# Patient Record
Sex: MALE | Race: AMERICAN INDIAN OR ALASKA NATIVE | NOT HISPANIC OR LATINO | Employment: OTHER | URBAN - METROPOLITAN AREA
[De-identification: names, ages, dates, MRNs, and addresses within clinical notes are randomized per-mention and may not be internally consistent; named-entity substitution may affect disease eponyms.]

---

## 2017-03-06 PROBLEM — K83.1 OBSTRUCTIVE JAUNDICE: Status: ACTIVE | Noted: 2017-03-06

## 2017-03-06 NOTE — PLAN OF CARE
Outside Transfer Acceptance Note    Transferring Physician or Mid Level Provider/Speciality: Dr. Rivera    Accepting Physician: Noy Sanders     Date of Acceptance: 03/06/2017     Code Status: Full    Transferring Facility/Hospital: [facility]    Reason for Transfer to [facility]: needs AES for ERCP    Report from Transferring Physician or Mid-Level provider/Hospital course: 72 M with a PMH of rectal cancer s/p resection. A few days ago, pt found to be jaundice T bili 9 and elevated ALk phos. CT and ultrasound showed a dilated CBD. ERCP was attempted, but he was not able to get very far to determine the cause of obstruction. T bili now up to 18. Alk phos >1000.      To do list upon patient arrival: Consult AES    Please call extension [phone] upon patient arrival to floor for Hospital Medicine admit team assignment and for additional admit orders. If patient is coming from another [facility] facility please also call [phone] to inform the admit team/office that patient has arrived from the [facility] facility to the floor so patient can be evaluated.

## 2017-03-07 ENCOUNTER — ANESTHESIA EVENT (OUTPATIENT)
Dept: ENDOSCOPY | Facility: HOSPITAL | Age: 72
DRG: 356 | End: 2017-03-07
Payer: MEDICARE

## 2017-03-07 ENCOUNTER — HOSPITAL ENCOUNTER (INPATIENT)
Facility: HOSPITAL | Age: 72
LOS: 2 days | Discharge: HOME | DRG: 356 | End: 2017-03-09
Attending: HOSPITALIST | Admitting: HOSPITALIST
Payer: MEDICARE

## 2017-03-07 DIAGNOSIS — K83.1 OBSTRUCTIVE JAUNDICE: ICD-10-CM

## 2017-03-07 PROBLEM — I10 ESSENTIAL HYPERTENSION: Chronic | Status: ACTIVE | Noted: 2017-03-07

## 2017-03-07 LAB
ALBUMIN SERPL BCP-MCNC: 2.4 G/DL
ALP SERPL-CCNC: 1114 U/L
ALT SERPL W/O P-5'-P-CCNC: 207 U/L
ANION GAP SERPL CALC-SCNC: 8 MMOL/L
AST SERPL-CCNC: 192 U/L
BASOPHILS # BLD AUTO: 0.09 K/UL
BASOPHILS # BLD AUTO: 0.09 K/UL
BASOPHILS NFR BLD: 0.5 %
BASOPHILS NFR BLD: 0.5 %
BILIRUB DIRECT SERPL-MCNC: >14 MG/DL
BILIRUB SERPL-MCNC: 23.7 MG/DL
BUN SERPL-MCNC: 20 MG/DL
CALCIUM SERPL-MCNC: 9.1 MG/DL
CHLORIDE SERPL-SCNC: 101 MMOL/L
CO2 SERPL-SCNC: 26 MMOL/L
CREAT SERPL-MCNC: 1.1 MG/DL
DIFFERENTIAL METHOD: ABNORMAL
DIFFERENTIAL METHOD: ABNORMAL
EOSINOPHIL # BLD AUTO: 0.8 K/UL
EOSINOPHIL # BLD AUTO: 0.8 K/UL
EOSINOPHIL NFR BLD: 4.7 %
EOSINOPHIL NFR BLD: 4.7 %
ERYTHROCYTE [DISTWIDTH] IN BLOOD BY AUTOMATED COUNT: 16.3 %
ERYTHROCYTE [DISTWIDTH] IN BLOOD BY AUTOMATED COUNT: 16.3 %
EST. GFR  (AFRICAN AMERICAN): >60 ML/MIN/1.73 M^2
EST. GFR  (NON AFRICAN AMERICAN): >60 ML/MIN/1.73 M^2
ESTIMATED AVG GLUCOSE: 140 MG/DL
GLUCOSE SERPL-MCNC: 122 MG/DL
GLUCOSE SERPL-MCNC: 137 MG/DL (ref 70–110)
HBA1C MFR BLD HPLC: 6.5 %
HCT VFR BLD AUTO: 36.3 %
HCT VFR BLD AUTO: 36.3 %
HGB BLD-MCNC: 12.7 G/DL
HGB BLD-MCNC: 12.7 G/DL
INR PPP: 1.8
LYMPHOCYTES # BLD AUTO: 2.1 K/UL
LYMPHOCYTES # BLD AUTO: 2.1 K/UL
LYMPHOCYTES NFR BLD: 12.1 %
LYMPHOCYTES NFR BLD: 12.1 %
MAGNESIUM SERPL-MCNC: 1.6 MG/DL
MAGNESIUM SERPL-MCNC: 1.6 MG/DL
MCH RBC QN AUTO: 28.5 PG
MCH RBC QN AUTO: 28.5 PG
MCHC RBC AUTO-ENTMCNC: 35 %
MCHC RBC AUTO-ENTMCNC: 35 %
MCV RBC AUTO: 81 FL
MCV RBC AUTO: 81 FL
MONOCYTES # BLD AUTO: 1.3 K/UL
MONOCYTES # BLD AUTO: 1.3 K/UL
MONOCYTES NFR BLD: 7.8 %
MONOCYTES NFR BLD: 7.8 %
NEUTROPHILS # BLD AUTO: 12.4 K/UL
NEUTROPHILS # BLD AUTO: 12.4 K/UL
NEUTROPHILS NFR BLD: 73.7 %
NEUTROPHILS NFR BLD: 73.7 %
PHOSPHATE SERPL-MCNC: 3 MG/DL
PHOSPHATE SERPL-MCNC: 3 MG/DL
PLATELET # BLD AUTO: 334 K/UL
PLATELET # BLD AUTO: 334 K/UL
PMV BLD AUTO: 9.6 FL
PMV BLD AUTO: 9.6 FL
POCT GLUCOSE: 106 MG/DL (ref 70–110)
POCT GLUCOSE: 137 MG/DL (ref 70–110)
POTASSIUM SERPL-SCNC: 3.9 MMOL/L
PROT SERPL-MCNC: 5.9 G/DL
PROTHROMBIN TIME: 18.6 SEC
RBC # BLD AUTO: 4.46 M/UL
RBC # BLD AUTO: 4.46 M/UL
SODIUM SERPL-SCNC: 135 MMOL/L
WBC # BLD AUTO: 16.89 K/UL
WBC # BLD AUTO: 16.89 K/UL

## 2017-03-07 PROCEDURE — 63600175 PHARM REV CODE 636 W HCPCS: Performed by: PHYSICIAN ASSISTANT

## 2017-03-07 PROCEDURE — 83735 ASSAY OF MAGNESIUM: CPT

## 2017-03-07 PROCEDURE — 85025 COMPLETE CBC W/AUTO DIFF WBC: CPT

## 2017-03-07 PROCEDURE — 63600175 PHARM REV CODE 636 W HCPCS: Performed by: HOSPITALIST

## 2017-03-07 PROCEDURE — 36415 COLL VENOUS BLD VENIPUNCTURE: CPT

## 2017-03-07 PROCEDURE — 83036 HEMOGLOBIN GLYCOSYLATED A1C: CPT

## 2017-03-07 PROCEDURE — 20600001 HC STEP DOWN PRIVATE ROOM

## 2017-03-07 PROCEDURE — 97165 OT EVAL LOW COMPLEX 30 MIN: CPT

## 2017-03-07 PROCEDURE — 99223 1ST HOSP IP/OBS HIGH 75: CPT | Mod: GC,,, | Performed by: INTERNAL MEDICINE

## 2017-03-07 PROCEDURE — 97116 GAIT TRAINING THERAPY: CPT

## 2017-03-07 PROCEDURE — 97161 PT EVAL LOW COMPLEX 20 MIN: CPT

## 2017-03-07 PROCEDURE — 84100 ASSAY OF PHOSPHORUS: CPT

## 2017-03-07 PROCEDURE — 97530 THERAPEUTIC ACTIVITIES: CPT

## 2017-03-07 PROCEDURE — 80048 BASIC METABOLIC PNL TOTAL CA: CPT

## 2017-03-07 PROCEDURE — 85610 PROTHROMBIN TIME: CPT

## 2017-03-07 PROCEDURE — 25000003 PHARM REV CODE 250: Performed by: PHYSICIAN ASSISTANT

## 2017-03-07 PROCEDURE — 80076 HEPATIC FUNCTION PANEL: CPT

## 2017-03-07 PROCEDURE — 99223 1ST HOSP IP/OBS HIGH 75: CPT | Mod: AI,,, | Performed by: PHYSICIAN ASSISTANT

## 2017-03-07 RX ORDER — ACETAMINOPHEN 325 MG/1
650 TABLET ORAL EVERY 4 HOURS PRN
Status: DISCONTINUED | OUTPATIENT
Start: 2017-03-07 | End: 2017-03-09 | Stop reason: HOSPADM

## 2017-03-07 RX ORDER — IBUPROFEN 200 MG
24 TABLET ORAL
Status: DISCONTINUED | OUTPATIENT
Start: 2017-03-07 | End: 2017-03-09 | Stop reason: HOSPADM

## 2017-03-07 RX ORDER — IBUPROFEN 200 MG
16 TABLET ORAL
Status: DISCONTINUED | OUTPATIENT
Start: 2017-03-07 | End: 2017-03-09 | Stop reason: HOSPADM

## 2017-03-07 RX ORDER — ONDANSETRON 2 MG/ML
4 INJECTION INTRAMUSCULAR; INTRAVENOUS EVERY 8 HOURS PRN
Status: DISCONTINUED | OUTPATIENT
Start: 2017-03-07 | End: 2017-03-09 | Stop reason: HOSPADM

## 2017-03-07 RX ORDER — GLUCAGON 1 MG
1 KIT INJECTION
Status: DISCONTINUED | OUTPATIENT
Start: 2017-03-07 | End: 2017-03-07

## 2017-03-07 RX ORDER — TRAMADOL HYDROCHLORIDE 50 MG/1
50 TABLET ORAL EVERY 6 HOURS PRN
Status: DISCONTINUED | OUTPATIENT
Start: 2017-03-07 | End: 2017-03-09 | Stop reason: HOSPADM

## 2017-03-07 RX ORDER — LANSOPRAZOLE 30 MG/1
30 CAPSULE, DELAYED RELEASE ORAL 2 TIMES DAILY
COMMUNITY

## 2017-03-07 RX ORDER — CYPROHEPTADINE HYDROCHLORIDE 4 MG/1
4 TABLET ORAL 3 TIMES DAILY PRN
COMMUNITY

## 2017-03-07 RX ORDER — LOPERAMIDE HYDROCHLORIDE 2 MG/1
2 CAPSULE ORAL
Status: DISCONTINUED | OUTPATIENT
Start: 2017-03-07 | End: 2017-03-09 | Stop reason: HOSPADM

## 2017-03-07 RX ORDER — GLUCAGON 1 MG
1 KIT INJECTION
Status: DISCONTINUED | OUTPATIENT
Start: 2017-03-07 | End: 2017-03-09 | Stop reason: HOSPADM

## 2017-03-07 RX ORDER — NEBIVOLOL 5 MG/1
5 TABLET ORAL DAILY
Status: DISCONTINUED | OUTPATIENT
Start: 2017-03-07 | End: 2017-03-09 | Stop reason: HOSPADM

## 2017-03-07 RX ORDER — LISINOPRIL 20 MG/1
20 TABLET ORAL 2 TIMES DAILY
COMMUNITY

## 2017-03-07 RX ORDER — ERGOCALCIFEROL 1.25 MG/1
50000 CAPSULE ORAL
COMMUNITY

## 2017-03-07 RX ORDER — INSULIN ASPART 100 [IU]/ML
0-5 INJECTION, SOLUTION INTRAVENOUS; SUBCUTANEOUS
Status: DISCONTINUED | OUTPATIENT
Start: 2017-03-07 | End: 2017-03-09 | Stop reason: HOSPADM

## 2017-03-07 RX ORDER — CYPROHEPTADINE HYDROCHLORIDE 4 MG/1
4 TABLET ORAL 3 TIMES DAILY PRN
Status: DISCONTINUED | OUTPATIENT
Start: 2017-03-07 | End: 2017-03-09 | Stop reason: HOSPADM

## 2017-03-07 RX ORDER — NEBIVOLOL 5 MG/1
5 TABLET ORAL DAILY
COMMUNITY

## 2017-03-07 RX ORDER — PANTOPRAZOLE SODIUM 40 MG/1
40 TABLET, DELAYED RELEASE ORAL DAILY
Status: DISCONTINUED | OUTPATIENT
Start: 2017-03-07 | End: 2017-03-09 | Stop reason: HOSPADM

## 2017-03-07 RX ORDER — PHYTONADIONE 10 MG/ML
5 INJECTION, EMULSION INTRAMUSCULAR; INTRAVENOUS; SUBCUTANEOUS ONCE
Status: COMPLETED | OUTPATIENT
Start: 2017-03-07 | End: 2017-03-07

## 2017-03-07 RX ORDER — POLYETHYLENE GLYCOL 3350 17 G/17G
17 POWDER, FOR SOLUTION ORAL 3 TIMES DAILY PRN
Status: DISCONTINUED | OUTPATIENT
Start: 2017-03-07 | End: 2017-03-09 | Stop reason: HOSPADM

## 2017-03-07 RX ORDER — RAMELTEON 8 MG/1
8 TABLET ORAL NIGHTLY PRN
Status: DISCONTINUED | OUTPATIENT
Start: 2017-03-07 | End: 2017-03-09 | Stop reason: HOSPADM

## 2017-03-07 RX ORDER — AMLODIPINE BESYLATE 5 MG/1
5 TABLET ORAL DAILY
COMMUNITY

## 2017-03-07 RX ORDER — INSULIN ASPART 100 [IU]/ML
0-5 INJECTION, SOLUTION INTRAVENOUS; SUBCUTANEOUS EVERY 6 HOURS PRN
Status: DISCONTINUED | OUTPATIENT
Start: 2017-03-07 | End: 2017-03-07

## 2017-03-07 RX ORDER — MORPHINE SULFATE 4 MG/ML
4 INJECTION, SOLUTION INTRAMUSCULAR; INTRAVENOUS EVERY 4 HOURS PRN
Status: DISCONTINUED | OUTPATIENT
Start: 2017-03-07 | End: 2017-03-09 | Stop reason: HOSPADM

## 2017-03-07 RX ORDER — TRAMADOL HYDROCHLORIDE 50 MG/1
50 TABLET ORAL EVERY 6 HOURS PRN
COMMUNITY

## 2017-03-07 RX ORDER — AMLODIPINE BESYLATE 5 MG/1
5 TABLET ORAL DAILY
Status: DISCONTINUED | OUTPATIENT
Start: 2017-03-07 | End: 2017-03-09 | Stop reason: HOSPADM

## 2017-03-07 RX ORDER — SAXAGLIPTIN 5 MG/1
5 TABLET, FILM COATED ORAL DAILY
COMMUNITY

## 2017-03-07 RX ORDER — IPRATROPIUM BROMIDE AND ALBUTEROL SULFATE 2.5; .5 MG/3ML; MG/3ML
3 SOLUTION RESPIRATORY (INHALATION) EVERY 4 HOURS PRN
Status: DISCONTINUED | OUTPATIENT
Start: 2017-03-07 | End: 2017-03-09 | Stop reason: HOSPADM

## 2017-03-07 RX ORDER — UBIDECARENONE 75 MG
500 CAPSULE ORAL DAILY
COMMUNITY

## 2017-03-07 RX ADMIN — AMLODIPINE BESYLATE 5 MG: 5 TABLET ORAL at 10:03

## 2017-03-07 RX ADMIN — PANTOPRAZOLE SODIUM 40 MG: 40 TABLET, DELAYED RELEASE ORAL at 10:03

## 2017-03-07 RX ADMIN — MORPHINE SULFATE 4 MG: 4 INJECTION INTRAVENOUS at 03:03

## 2017-03-07 RX ADMIN — NEBIVOLOL HYDROCHLORIDE 5 MG: 5 TABLET ORAL at 10:03

## 2017-03-07 RX ADMIN — PHYTONADIONE 5 MG: 10 INJECTION, EMULSION INTRAMUSCULAR; INTRAVENOUS; SUBCUTANEOUS at 03:03

## 2017-03-07 RX ADMIN — MORPHINE SULFATE 4 MG: 4 INJECTION INTRAVENOUS at 08:03

## 2017-03-07 NOTE — PLAN OF CARE
Admit early this morning by Loree/Anastasiya  Discussed with AES  Need INR <1.6 before ERCP, so giving Vit K 5mg SQ now, NPO after midnight for ERCP tomorrow  Resume diabetic, low sodium diet. Changed POCT glucose to qhs and ac (from q6) while eating

## 2017-03-07 NOTE — IP AVS SNAPSHOT
[facility]  [address]  [facility] [address]  Phone: [phone]           Patient Discharge Instructions     Our goal is to set you up for success. This packet includes information on your condition, medications, and your home care. It will help you to care for yourself so you don't get sicker and need to go back to the hospital.     Please ask your nurse if you have any questions.        There are many details to remember when preparing to leave the hospital. Here is what you will need to do:    1. Take your medicine. If you are prescribed medications, review your Medication List in the following pages. You may have new medications to  at the pharmacy and others that you'll need to stop taking. Review the instructions for how and when to take your medications. Talk with your doctor or nurses if you are unsure of what to do.     2. Go to your follow-up appointments. Specific follow-up information is listed in the following pages. Your may be contacted by a transition nurse or clinical provider about future appointments. Be sure we have all of the phone numbers to reach you, if needed. Please contact your provider's office if you are unable to make an appointment.     3. Watch for warning signs. Your doctor or nurse will give you detailed warning signs to watch for and when to call for assistance. These instructions may also include educational information about your condition. If you experience any of warning signs to your health, call your doctor.               [facility]  Unless otherwise directed by your provider, please contact [facility], our nurse care line that is available for 24/7 assistance.     [phone] (toll-free)    Registered nurses in the [facility] provide clinical advisement, health education, appointment booking, and other advisory services.                    ** Verify the list of medication(s) below is accurate and up  to date. Carry this with you in case of emergency. If your medications have changed, please notify your healthcare provider.             Medication List      START taking these medications        Additional Info                      multivitamin tablet   Commonly known as:  THERAGRAN   Refills:  0   Dose:  1 tablet    Last time this was given:  1 tablet on 3/9/2017  9:17 AM   Instructions:  Take 1 tablet by mouth once daily.     Begin Date    AM    Noon    PM    Bedtime         CONTINUE taking these medications        Additional Info                      amlodipine 5 MG tablet   Commonly known as:  NORVASC   Refills:  0   Dose:  5 mg    Last time this was given:  5 mg on 3/9/2017  9:17 AM   Instructions:  Take 5 mg by mouth once daily.     Begin Date    AM    Noon    PM    Bedtime       cyproheptadine 4 mg tablet   Commonly known as:  PERIACTIN   Refills:  0   Dose:  4 mg    Instructions:  Take 4 mg by mouth 3 (three) times daily as needed.     Begin Date    AM    Noon    PM    Bedtime       lansoprazole 30 MG capsule   Commonly known as:  PREVACID   Refills:  0   Dose:  30 mg    Instructions:  Take 30 mg by mouth 2 (two) times daily.     Begin Date    AM    Noon    PM    Bedtime       lisinopril 20 MG tablet   Commonly known as:  PRINIVIL,ZESTRIL   Refills:  0   Dose:  20 mg    Instructions:  Take 20 mg by mouth 2 (two) times daily.     Begin Date    AM    Noon    PM    Bedtime       nebivolol 5 MG Tab   Commonly known as:  BYSTOLIC   Refills:  0   Dose:  5 mg    Last time this was given:  5 mg on 3/9/2017  9:17 AM   Instructions:  Take 5 mg by mouth once daily.     Begin Date    AM    Noon    PM    Bedtime       SAXagliptin 5 mg Tab tablet   Commonly known as:  ONGLYZA   Refills:  0   Dose:  5 mg    Instructions:  Take 5 mg by mouth once daily.     Begin Date    AM    Noon    PM    Bedtime       tramadol 50 mg tablet   Commonly known as:  ULTRAM   Refills:  0   Dose:  50 mg   Indications:  Pain    Last time this  was given:  50 mg on 3/9/2017 12:23 AM   Instructions:  Take 50 mg by mouth every 6 (six) hours as needed for Pain (1-2 tabs).     Begin Date    AM    Noon    PM    Bedtime       VITAMIN B-12 500 MCG tablet   Refills:  0   Dose:  500 mcg   Generic drug:  cyanocobalamin    Instructions:  Take 500 mcg by mouth once daily.     Begin Date    AM    Noon    PM    Bedtime       VITAMIN D2 50,000 unit Cap   Refills:  0   Dose:  14910 Units   Generic drug:  ergocalciferol    Instructions:  Take 50,000 Units by mouth every 7 days.     Begin Date    AM    Noon    PM    Bedtime            Where to Get Your Medications      You can get these medications from any pharmacy     You don't need a prescription for these medications     multivitamin tablet                  Please bring to all follow up appointments:    1. A copy of your discharge instructions.  2. All medicines you are currently taking in their original bottles.  3. Identification and insurance card.    Please arrive 15 minutes ahead of scheduled appointment time.    Please call 24 hours in advance if you must reschedule your appointment and/or time.        Follow-up Information     Follow up with Dr Chang (outside Oncologist) In 2 weeks.        Follow up with Dr Timmons (outside surgeon) In 2 weeks.        Discharge Instructions     Future Orders    Activity as tolerated     Call MD for:  difficulty breathing or increased cough     Call MD for:  persistent dizziness, light-headedness, or visual disturbances     Call MD for:  persistent nausea and vomiting or diarrhea     Call MD for:  severe uncontrolled pain     Call MD for:  temperature >100.4     Call MD for:  worsening rash     Diet general     Questions:    Total calories:      Fat restriction, if any:      Protein restriction, if any:      Na restriction, if any:      Fluid restriction:      Additional restrictions:  Diabetic 2000    Low Sodium,2gm        Discharge Instructions         Endoscopic Ultrasound  (EUS)    An endoscopic ultrasound (EUS) is a test to look at the inside of your gastrointestinal (GI) tract. It's commonly used to look for cancers or growths in the esophagus, stomach, pancreas, liver, and rectum. It can help to stage cancer (see how advanced a cancer is). EUS may also be used to help diagnose certain diseases or to drain cysts or abscesses.  What is EUS?  EUS shows both ultrasound images and live video of the GI tract. During the test, a flexible tube called an endoscope (scope) is used. At the end of the scope is a tiny video camera and light. The video camera sends live images to a monitor. The scope also contains a very small ultrasound device. This uses sound waves to create images and send them to a monitor.  A needle is passed through the scope. The needle can be used take a small sample of tissue for testing. This is called a biopsy. The needle can be used to take a sample of fluid. This is called fine-needle aspiration (FNA).  Risks and possible complications of EUS  Risks and possible complications include the following:  · Bleeding  · Infection  · A perforation (hole) in the digestive tract   · Risks of sedation or anesthesia   Before the test  Be prepared prior to the test:  · Tell your healthcare provider what medicine you take. This includes vitamins, herbs, and over-the-counter medicine. It also includes any blood thinners, such as warfarin, clopidogrel, ibuprofen, or daily aspirin. Ask your healthcare provider if you need to stop taking some or all of them before the test.  · You may be prescribed antibiotics to take before or after the test. This depends on the area being studied and what is done during the test. These medicines help prevent infection.  · Carefully follow the instructions for preparing for the test to make sure results are accurate. Instructions may include:  ¨ If youre having an EUS of the upper GI tract (esophagus, stomach, duodenum, pancreas, liver):  § Do not  eat or drink for 6 hours before the test.  ¨ If youre having an EUS of the lower GI tract (rectum):  § Before the test, do bowel prep as instructed to clean your rectum of stool. This may involve a clear liquid diet and using a laxative (liquid or pills) the night before the test. Or it may mean doing one or more enemas the morning of the test.  § Do not eat or drink for 6 hours before the test.  · Be sure to arrive on time at the facility. Bring your identification and health insurance card. Leave valuables at home. If you have them, bring X-rays or other test results with you.  Let the healthcare provider know  For your safety, tell the healthcare provider if you:  · Take insulin. Your dose may need to be changed on the day of your test.  · Are allergic to latex.  · Have any other allergies.  · Are taking blood thinners.   During the test  An endoscopic ultrasound usually takes place in a hospital. The procedure itself may take 1 to 2 hours. You will likely go home soon afterward. During the test:  · You lie on your left side on an exam table.  · An intravenous (IV) line will be put into a vein in your arm or hand. This line supplies fluids and medicines. To keep you comfortable during the test, you will be given a sedative medicine. This medicine prevents discomfort and will make you sleepy.  · If you are having an EUS of the upper GI tract, local anesthetic may be sprayed in your throat. This will help you be more comfortable as the healthcare provider inserts the scope. The healthcare provider then gently puts the flexible scope into your mouth or nose and down your throat.  · If youre having an EUS of the lower GI tract, the healthcare provider gently puts the flexible scope into your anus.  · During the test, the scope sends live video and ultrasound images from inside your body to nearby monitors. These are used to examine your GI tract. Specialized procedures, such as drainage, are done as needed.  · The  healthcare provider may discuss the results with you soon after the test. Biopsy results take several  days.  · In most cases, you can go home within a few hours of the test. When you leave the facility, have an adult family member or friend drive you, even if you don't feel that sleepy.  After the test  Here is what to expect after the test:  · You may feel tired from the sedative. This should wear off by the end of the day.  · If you had an upper digestive endoscopy, your throat may feel sore for a day or two. Over-the-counter sore throat lozenges and spray should help.  · You can eat and drink normally as soon as the test is done.  When to call the healthcare provider  Call your healthcare provider if you notice any of the following:  · Fever of 100.4°F (38.0°C) or higher, or as advised by your healthcare provider  · Shortness of breath  · Vomiting blood, blood in stool, or black stools  · Coughing or hoarse voice that wont go away   Date Last Reviewed: 7/1/2016 © 2000-2016 [employer]. [address] [place] [address]. All rights reserved. This information is not intended as a substitute for professional medical care. Always follow your healthcare professional's instructions.        ERCP  (Endoscopic Retrograde Cholangiopancreatography)     The endoscope moves from the mouth, through the upper digestive tract, to the common bile duct opening.          A balloon at the tip of a catheter opens above the stone. The stone is pulled out of the duct and leaves your body through stool.       ERCP stands for endoscopic retrograde cholangiopancreatography. This procedure is used to view the biliary and pancreatic ducts.  It is used to evaluate diseases that affect the ducts and to help locate and treat blockages that may be present.  How do I get ready for ERCP?  · Talk to your doctor about any health problems or allergies you have.  · Ask your doctor about the risks of ERCP. These include  pancreatitis, infection, bleeding, and tearing the bowel.  · Be sure your doctor knows about all medicines you take. You may be told to stop taking some or all of them before the test. This includes:  · All prescription medicines  · Over-the-counter medicines that don't need a prescription  ·  Any street drugs you may use   · Herbs, vitamins, kelp, seaweed, cough syrups, and other supplements  · You may be asked to take antibiotics ahead of time.  · Avoid blood-thinning medicines for 1 week before ERCP.  · Do not eat or drink for 8 to 12 hours before ERCP.  · Have someone ready to take you home.  What happens during the procedure?  · You may be given medicine through an IV to help you relax.  · Your throat is numbed.  · A thin tube (endoscope) is placed into your throat. It is advanced from the throat through the upper digestive tract, to the common bile duct opening. The endoscope lets the doctor see the common bile and pancreatic ducts on a video screen.  · A cut may be made where the common bile duct opens to the duodenum to make it easier to remove stones.  · As blockages are located and removed, X-rays are taken.  · Contrast dye is injected through a catheter to make the duct show up better on the X-rays.  · An imaging technique that uses X-rays to obtain real-time moving images of internal organs is called fluoroscopy. Fluoroscopy is used to watch and guide progress of the procedure.   · In some cases, a plastic tube (stent) is placed to hold the ducts open. This stent may be replaced or removed in 6 to 8 weeks. Or it may be left to fall out on its own and be passed in the stool.  What happens after ERCP?  Your doctor may discuss the test results right away or a return visit may be scheduled. You may go home the same day or spend the night in the hospital. Follow these tips:  · You can return to a normal routine the day after the ERCP.  · If a cut was made in the duct, avoid blood-thinning medicines such as  "aspirin for 5 to 7 days.  · Call your doctor right away if you have a fever or abdominal pain. These may be signs of an infection or torn bowel.   Date Last Reviewed: 6/19/2015  © 8248-8464 [employer]. [address] [place] [address]. All rights reserved. This information is not intended as a substitute for professional medical care. Always follow your healthcare professional's instructions.            Primary Diagnosis     Your primary diagnosis was:  Blockage Of A Bile Duct      Admission Information     Date & Time Provider Department CSN    3/7/2017  1:23 AM Luciana Hyman MD [facility] [id number]      Care Providers     Provider Role Specialty Primary office phone    Luciana Hyman MD Attending Provider Hospitalist [phone]    Francisco Javier Downey MD Consulting Physician  Gastroenterology [phone]    Anoop Rae MD Consulting Physician  Gastroenterology [phone]    Nazario Enciso MD Consulting Physician  Gastroenterology [phone]    Luciana Hyman MD Team Attending  Hospitalist [phone]    Anoop Rae MD Surgeon  Gastroenterology [phone]      Your Vitals Were     BP Pulse Temp Resp Height Weight    123/60 (BP Location: Left arm, Patient Position: Lying, BP Method: Automatic) 78 98.1 °F (36.7 °C) (Oral) 18 5' 11" (1.803 m) 67.3 kg (148 lb 5.9 oz)    SpO2 BMI             95% 20.69 kg/m2         Recent Lab Values        3/7/2017                           5:45 AM           A1C 6.5 (H)           Comment for A1C at  5:45 AM on 3/7/2017:  According to ADA guidelines, hemoglobin A1C <7.0% represents  optimal control in non-pregnant diabetic patients.  Different  metrics may apply to specific populations.   Standards of Medical Care in Diabetes - 2016.  For the purpose of screening for the presence of diabetes:  <5.7%     Consistent with the absence of diabetes  5.7-6.4%  Consistent with increasing risk for diabetes "   (prediabetes)  >or=6.5%  Consistent with diabetes  Currently no consensus exists for use of hemoglobin A1C  for diagnosis of diabetes for children.        Pending Labs     Order Current Status    Cytology Specimen-FNA Radiology Guided, Bronch/EBUS, EUS/GI with Path Adequacy Collected (03/08/17 1408)    Cytology Specimen-FNA Radiology Guided, Bronch/EBUS, EUS/GI with Path Adequacy In process    Cytology Specimen-Medical Cytology (Fluid/Wash/Brush) In process      Allergies as of 3/9/2017     No Known Allergies      Advance Directives     An advance directive is a document which, in the event you are no longer able to make decisions for yourself, tells your healthcare team what kind of treatment you do or do not want to receive, or who you would like to make those decisions for you.  If you do not currently have an advance directive, [facility] encourages you to create one.  For more information call:  [phone]), [phone] ([phone]),  or log on to [email or web address].        Language Assistance Services     ATTENTION: Language assistance services are available, free of charge. Please call [phone].      ATENCIÓN: Si habla español, tiene a cole disposición servicios gratuitos de asistencia lingüística. Llame al [phone].     CHÚ Ý: N?u b?n nói Ti?ng Vi?t, có các d?ch v? h? tr? ngôn ng? mi?n phí dành cho b?n. G?i s? [phone].        Diabetes Discharge Instructions                                   [facility] Sign-Up     Activating your [facility] account is as easy as 1-2-3!     1) Visit [email or web address], select Sign Up Now, enter this activation code and your date of birth, then select Next.  [email or web address]  Expires: 4/23/2017 10:32 AM      2) Create a username and password to use when you visit [facility] in the future and select a security question in case you lose your password and select Next.    3) Enter your e-mail address and click Sign Up!    Additional  Information  If you have questions, please e-mail [email or web address] or call [phone] to talk to our [facility] staff. Remember, [facility] is NOT to be used for urgent needs. For medical emergencies, dial 911.          [facility] complies with applicable Federal civil rights laws and does not discriminate on the basis of race, color, national origin, age, disability, or sex.

## 2017-03-07 NOTE — PT/OT/SLP EVAL
"Physical Therapy  Evaluation    Brock Gasca   MRN: [id number]   Admitting Diagnosis: Obstructive jaundice    PT Received On: 17  PT Start Time: 925     PT Stop Time: 50    PT Total Time (min): 25 min       Billable Minutes:  Evaluation 16 and Gait Training9    Diagnosis: Obstructive jaundice  Decreased mobility, decreased endurance, decreased activity tolerance.    Past Medical History:   Diagnosis Date    Cancer     Diabetes mellitus     Hypertension       Past Surgical History:   Procedure Laterality Date    COLON SURGERY         Referring physician: Rosibel Ralph PA-C  Date referred to PT: 3/6/2017    General Precautions: Standard, fall, NPO, aspiration  Orthopedic Precautions: N/A   Braces: N/A       Do you have any cultural, spiritual, Methodist conflicts, given your current situation?: none    Patient History:  Lives With: alone  Living Arrangements: apartment (shelter apartment)  Home Layout: Able to live on 1st floor  Transportation Available: family or friend will provide  Living Environment Comment: Lives in shelter apartment. Independnet with all ADl's prior to admit. No AD's, walkin shower.  Equipment Currently Used at Home: none  DME owned (not currently used): none    Previous Level of Function:  Ambulation Skills: independent  Transfer Skills: independent  ADL Skills: independent  Work/Leisure Activity: independent    Subjective:  Communicated with nursing prior to session.  "I am pretty tired, I will try. I haven't walked since I go in here."  Chief Complaint: fatigue, pain  Patient goals: return home    Pain Ratin/10         Location: abdomen  Pain Addressed: Reposition, Distraction  Pain Rating Post-Intervention: 10    Objective:         Cognitive Exam:  Oriented to: Person, Place, Time and Situation    Follows Commands/attention: Follows two-step commands  Communication: clear/fluent  Safety awareness/insight to disability: intact    Physical Exam:  Postural " examination/scapula alignment: Rounded shoulder, Head forward and Abnormal trunk flexion    Skin integrity: Visible skin intact  Edema: Mild JAIME LE    Sensation:   Intact    Upper Extremity Range of Motion:    Lower Extremity Range of Motion:  Right Lower Extremity: WFL  Left Lower Extremity: WFL    Lower Extremity Strength:  Right Lower Extremity: WFL except hip flexion 3+/5  Left Lower Extremity: WFL except hip flexion 3+/5       Functional Mobility:  Bed Mobility:  Rolling/Turning to Left: Supervision  Supine to Sit: Modified Independent  Sit to Supine: Modified Independent    Transfers:  Sit <> Stand Assistance: Stand By Assistance  Sit <> Stand Assistive Device: Rolling Walker    Gait:   Gait Distance: 700' SBA with RW. Slow pace, forward flexed  Gait Assistive Device: Rolling walker  Gait Pattern: swing-through gait  Gait Deviation(s): decreased laurie, increased time in double stance, decreased velocity of limb motion, decreased step length, decreased stride length      Balance:   Static Sit: FAIR+: Able to take MINIMAL challenges from all directions  Dynamic Sit: GOOD-: Maintains balance through MODERATE excursions of active trunk movement,     Static Stand: FAIR+: Takes MINIMAL challenges from all directions  Dynamic stand: FAIR+: Needs CLOSE SUPERVISION during gait and is able to right self with minor LOB    Therapeutic Activities and Exercises:  Evaluation. Gait training with RW. Cues for RW management and safety. Cues for posture, hand placement, distance from walker. Pt reports he feels safer with walker due to weakness, fatigue.    AM-PAC 6 CLICK MOBILITY  How much help from another person does this patient currently need?   1 = Unable, Total/Dependent Assistance  2 = A lot, Maximum/Moderate Assistance  3 = A little, Minimum/Contact Guard/Supervision  4 = None, Modified [place] Part/Independent    Turning over in bed (including adjusting bedclothes, sheets and blankets)?: 4  Sitting down on and  standing up from a chair with arms (e.g., wheelchair, bedside commode, etc.): 4  Moving from lying on back to sitting on the side of the bed?: 4  Moving to and from a bed to a chair (including a wheelchair)?: 4  Need to walk in hospital room?: 3  Climbing 3-5 steps with a railing?: 2  Total Score: 21     AM-PAC Raw Score CMS G-Code Modifier Level of Impairment Assistance   6 % Total / Unable   7 - 9 CM 80 - 100% Maximal Assist   10 - 14 CL 60 - 80% Moderate Assist   15 - 19 CK 40 - 60% Moderate Assist   20 - 22 CJ 20 - 40% Minimal Assist   23 CI 1-20% SBA / CGA   24 CH 0% Independent/ Mod I     Patient left supine with call button in reach and nursing notified.    Assessment:   Brock Gasca is a 72 y.o. male with a medical diagnosis of Obstructive jaundice and presents with impaired mobility, decreased endurance, decreased activity tolerance and weakness. Pt will benefit from skilled therapy to address mobility while in facility. Pt would benefit from HHPT to return to prior level of function and to improve safety after DC.    Rehab identified problem list/impairments: Rehab identified problem list/impairments: weakness, impaired self care skills, impaired endurance, impaired functional mobilty, gait instability, pain, impaired cardiopulmonary response to activity    Rehab potential is good.    Activity tolerance: Good    Discharge recommendations: Discharge Facility/Level Of Care Needs: home health PT     Barriers to discharge:      Equipment recommendations: Equipment Needed After Discharge: shower chair, walker, rolling     GOALS:   Physical Therapy Goals        Problem: Physical Therapy Goal    Goal Priority Disciplines Outcome Goal Variances Interventions   Physical Therapy Goal     PT/OT, PT Ongoing (interventions implemented as appropriate)     Description:  Goals to be met by: 3/24/2017     Patient will increase functional independence with mobility by performin. Sit to stand transfer with  Modified [place]  2. Gait  x 200 feet with Modified [place] using Rolling Walker.   3. Lower extremity exercise program x 30 reps per handout, with independence to increase endurance and activity tolerance.                PLAN:    Patient to be seen 3 x/week to address the above listed problems via gait training, therapeutic activities, therapeutic exercises, neuromuscular re-education  Plan of Care expires: 03/31/17  Plan of Care reviewed with: patient          Blanca E Delvin, PT  03/07/2017

## 2017-03-07 NOTE — ANESTHESIA PREPROCEDURE EVALUATION
03/07/2017    Pre-operative evaluation for Procedure(s) (LRB):  ERCP (N/A)    Brock Gasca is a 72 y.o. male with PMH of HTN and DM2 (A1C 6.5) who is being evaluated for the above procedure secondary to obstructive jaundice. Pt has a history of rectal cancer s/p APR with end colostomy 2010 and left hemicolectomy in 2014. He presented from [facility] after failed ERCP.   INR 1.8        LDA:  None currently     Prev airway:  None on file     Drips: none     Patient Active Problem List   Diagnosis    Obstructive jaundice    Essential hypertension    Type 2 diabetes mellitus without complication, without long-term current use of insulin       Review of patient's allergies indicates:  No Known Allergies     No current facility-administered medications on file prior to encounter.      No current outpatient prescriptions on file prior to encounter.       Past Surgical History:   Procedure Laterality Date    COLON SURGERY         Social History     Social History    Marital status:      Spouse name: N/A    Number of children: N/A    Years of education: N/A     Occupational History    Not on file.     Social History Main Topics    Smoking status: Former Smoker    Smokeless tobacco: Not on file    Alcohol use No    Drug use: No    Sexual activity: Not on file     Other Topics Concern    Not on file     Social History Narrative    No narrative on file         Vital Signs Range (Last 24H):  Temp:  [36.7 °C (98 °F)-36.9 °C (98.5 °F)]   Pulse:  [78-84]   Resp:  [17-18]   BP: (118-145)/(64-75)   SpO2:  [94 %-96 %]       CBC:   Recent Labs      03/07/17   0545   WBC  16.89*  16.89*   RBC  4.46*  4.46*   HGB  12.7*  12.7*   HCT  36.3*  36.3*   PLT  334  334   MCV  81*  81*   MCH  28.5  28.5   MCHC  35.0  35.0       CMP:   Recent Labs      03/07/17   0545  03/07/17   0842   NA  135*    --    K  3.9   --    CL  101   --    CO2  26   --    BUN  20   --    CREATININE  1.1   --    GLU  122*   --    MG  1.6  1.6   --    PHOS  3.0  3.0   --    CALCIUM  9.1   --    ALBUMIN   --   2.4*   PROT   --   5.9*   ALKPHOS   --   1114*   ALT   --   207*   AST   --   192*   BILITOT   --   23.7*       INR  Recent Labs      03/07/17   1041   INR  1.8*           Diagnostic Studies:      EKG: none on file       2D Echo: none on file     CXR: none on file           [facility] Anesthesia Evaluation    I have reviewed the Patient Summary Reports.     I have reviewed the Medications.     Review of Systems  Anesthesia Hx:  No problems with previous Anesthesia  History of prior surgery of interest to airway management or planning: Previous anesthesia: General Denies Family Hx of Anesthesia complications.   Denies Personal Hx of Anesthesia complications.   Social:  Former Smoker    Cardiovascular:   Hypertension Denies MI.      Pulmonary:   Denies COPD.  Denies Asthma.    Hepatic/GI:   GERD    Neurological:   Denies CVA.    Endocrine:   Diabetes, well controlled, type 2        Physical Exam  General:  Malnutrition, Jaundice    Airway/Jaw/Neck:  Airway Findings: Mouth Opening: Normal Tongue: Normal  General Airway Assessment: Adult  Mallampati: II  Improves to II with phonation.  TM Distance: Normal, at least 6 cm  Jaw/Neck Findings:  Neck ROM: Normal ROM      Dental:  Dental Findings:    Chest/Lungs:  Chest/Lungs Findings: Clear to auscultation, Normal Respiratory Rate     Heart/Vascular:  Heart Findings: Rate: Normal  Rhythm: Regular Rhythm  Sounds: Normal             Anesthesia Plan  Type of Anesthesia, risks & benefits discussed:  Anesthesia Type:  MAC, general  Patient's Preference:   Intra-op Monitoring Plan: standard ASA monitors  Intra-op Monitoring Plan Comments:   Post Op Pain Control Plan:   Post Op Pain Control Plan Comments:   Induction:   IV  Beta Blocker:  Patient is on a Beta-Blocker and has received one dose within  the past 24 hours (No further documentation required).       Informed Consent: Patient understands risks and agrees with Anesthesia plan.  Questions answered. Anesthesia consent signed with patient.  ASA Score: 3     Day of Surgery Review of History & Physical:    H&P update referred to the surgeon.         Ready For Surgery From Anesthesia Perspective.

## 2017-03-07 NOTE — CONSULTS
Advanced Endoscopy Service Consult    Reason for Consult: Obstructive jaundice     HPI:  This is a 72 year old male with a history of rectal cancer s/p APR with end colostomy 2010 and left hemicolectomy in 2014. He presents from [facility] after failed ERCP for obstructive jaundice.     He reports a one month history of abdominal discomfort, post-prandial fullness, 30lb weight loss and symptoms of dysphagia. He presented to outside hospital 03/01 at the request of his oncologist. Labs 03/06 reveal: Tbili 17.8, AP 1006, , . MRI with and without contrast 03/06 shows CBD 15mm with intrahepatic ductal dilation and stones within the GB. There is also mention of right hydronephrosis.     He lives alone in [place]. He denies tobacco or alcohol use since the cancer diagnosis in 2010. Family history includes his father with an unknown cancer.     Constitutional: no fever or chills   Eyes: no visual changes   ENT: no sore throat or dysphagia  Respiratory: no cough or shortness of breath   Cardiovascular: no chest pain or palpitations   Gastrointestinal: as per HPI  Hematologic/Lymphatic: no easy bruising or lymphadenopathy   Musculoskeletal: no arthralgias or myalgias   Neurological: no seizures, tremors or change in mental status  Behavioral/Psych: no auditory or visual hallucinations    Past Medical History:   Diagnosis Date    Cancer     Diabetes mellitus     Hypertension        Past Surgical History:   Procedure Laterality Date    COLON SURGERY         History reviewed. No pertinent family history.    Review of patient's allergies indicates:  No Known Allergies    Social History     Social History    Marital status:      Spouse name: N/A    Number of children: N/A    Years of education: N/A     Social History Main Topics    Smoking status: Former Smoker    Smokeless tobacco: None    Alcohol use No    Drug use: No    Sexual activity: Not Asked     Other Topics Concern    None  "    Social History Narrative    None        amlodipine  5 mg Oral Daily    nebivolol  5 mg Oral Daily    pantoprazole  40 mg Oral Daily       /64 (BP Location: Right arm, Patient Position: Lying, BP Method: Automatic)  Pulse 79  Temp 98 °F (36.7 °C) (Oral)   Resp 17  Ht 5' 11" (1.803 m)  Wt 67.7 kg (149 lb 4 oz)  SpO2 (!) 94%  BMI 20.82 kg/m2  General appearance: alert, appears stated age and cooperative.  Eyes: negative findings: conjunctivae normal and scleral icterus.   Throat: lips, mucosa, and tongue normal.  Lungs: clear to auscultation bilaterally.  Heart: S1, S2 normal.  Abdomen: soft, non-tender; bowel sounds normal; no masses, no organomegaly.  Skin: no rashes to visible areas.  Lymph nodes: No cervical or supraclavicular adenopathy appreciated  Neurologic: Mental status: alert, oriented, thought content appropriate.  Extremities: No lower extremity edema, Bilateral lower extremity muscle wasting appreciated.      Laboratory:    Recent Labs  Lab 03/07/17  0545 03/07/17  0842   *  --    K 3.9  --      --    CO2 26  --    BUN 20  --    CREATININE 1.1  --    CALCIUM 9.1  --    PROT  --  5.9*   BILITOT  --  23.7*   ALKPHOS  --  1114*   ALT  --  207*   AST  --  192*         Recent Labs  Lab 03/07/17  0545   WBC 16.89*  16.89*   HGB 12.7*  12.7*   HCT 36.3*  36.3*     334       No results for input(s): INR in the last 168 hours.    Assessment:  This is a 72 year old male with a history of rectal cancer s/p APR with end colostomy 2010 and left hemicolectomy in 2014. He presents from [facility] after failed ERCP for obstructive jaundice.     1. Obstructive jaundice concerning for possible stone or malignancy - given past history. Will require further investigation.     Plan:  1. Plan for ERCP today if INR is 1.6 or less. Otherwise give vitamin K and will we will plan to proceed tomorrow.   2. Keep Npo.    Marissa Leon MD PGY-6  Gastroenterology Fellow  Pager# " [phone]

## 2017-03-07 NOTE — SUBJECTIVE & OBJECTIVE
Past Medical History:   Diagnosis Date    Cancer     Diabetes mellitus     Hypertension        Past Surgical History:   Procedure Laterality Date    COLON SURGERY         Review of patient's allergies indicates:  No Known Allergies    No current facility-administered medications on file prior to encounter.      No current outpatient prescriptions on file prior to encounter.     Family History     None        Social History Main Topics    Smoking status: Former Smoker    Smokeless tobacco: Not on file    Alcohol use No    Drug use: No    Sexual activity: Not on file     Review of Systems   Constitutional: Positive for appetite change and unexpected weight change. Negative for activity change, chills, diaphoresis, fatigue and fever.   HENT: Negative for congestion, rhinorrhea, sore throat, trouble swallowing and voice change.    Eyes: Negative for visual disturbance.   Respiratory: Negative for cough, choking, chest tightness, shortness of breath and wheezing.    Cardiovascular: Negative for chest pain, palpitations and leg swelling.   Gastrointestinal: Positive for abdominal pain. Negative for abdominal distention, anal bleeding, blood in stool, constipation, diarrhea, nausea and vomiting.   Endocrine: Negative for cold intolerance, heat intolerance, polydipsia and polyuria.   Genitourinary: Negative for dysuria, flank pain, frequency, hematuria and urgency.   Musculoskeletal: Positive for back pain (Chronic). Negative for arthralgias, joint swelling and myalgias.   Skin: Negative for color change and rash.   Neurological: Negative for dizziness, seizures, syncope, facial asymmetry, speech difficulty, weakness, light-headedness, numbness and headaches.   Hematological: Negative for adenopathy. Does not bruise/bleed easily.   Psychiatric/Behavioral: Negative for agitation, confusion, hallucinations and suicidal ideas.     Objective:     Vital Signs (Most Recent):  Temp: 98.4 °F (36.9 °C) (03/07/17  0332)  Pulse: 78 (03/07/17 0332)  Resp: 18 (03/07/17 0332)  BP: 118/65 (03/07/17 0332)  SpO2: (!) 94 % (03/07/17 0332) Vital Signs (24h Range):  Temp:  [98.4 °F (36.9 °C)] 98.4 °F (36.9 °C)  Pulse:  [78-84] 78  Resp:  [18] 18  SpO2:  [94 %-95 %] 94 %  BP: (118-145)/(65-75) 118/65     Weight: 68.4 kg (150 lb 12.7 oz)  Body mass index is 21.03 kg/(m^2).    Physical Exam   Constitutional: He is oriented to person, place, and time. He appears well-developed. No distress.   HENT:   Head: Normocephalic and atraumatic.   Eyes: Pupils are equal, round, and reactive to light.   Neck: Neck supple. Carotid bruit is not present. No thyromegaly present.   Cardiovascular: Normal rate and regular rhythm.  Exam reveals no gallop.    No murmur heard.  Pulmonary/Chest: Effort normal and breath sounds normal. No respiratory distress. He has no wheezes.   Abdominal: Bowel sounds are normal. He exhibits no distension. There is no splenomegaly or hepatomegaly. There is no tenderness.   Colostomy   Musculoskeletal: Normal range of motion. He exhibits no edema.   Neurological: He is alert and oriented to person, place, and time.   Skin: Skin is warm and dry. No rash noted.   Psychiatric: He has a normal mood and affect. His behavior is normal.        Significant Labs: All pertinent labs within the past 24 hours have been reviewed.    Significant Imaging: I have reviewed all pertinent imaging results/findings within the past 24 hours.

## 2017-03-07 NOTE — NURSING TRANSFER
Nursing Transfer Note      3/7/2017     Transfer From: [facility]     Transfer via stretcher    Transfer with urinal and belongings     Transported by EMS    Medicines sent: YES    Chart send with patient: Yes    Notified: Pt already notified family of hospital transfer and they will be here tomorrow.     Patient reassessed at: 3/7/17 @ 0130    Upon arrival to floor: patient oriented to room, call bell in reach and bed in lowest position

## 2017-03-07 NOTE — PLAN OF CARE
Problem: Physical Therapy Goal  Goal: Physical Therapy Goal  Goals to be met by: 3/24/2017     Patient will increase functional independence with mobility by performin. Sit to stand transfer with Modified [place]  2. Gait x 200 feet with Modified [place] using Rolling Walker.   3. Lower extremity exercise program x 30 reps per handout, with independence to increase endurance and activity tolerance.  Outcome: Ongoing (interventions implemented as appropriate)  Goals established on this date

## 2017-03-07 NOTE — PLAN OF CARE
Problem: Occupational Therapy Goal  Goal: Occupational Therapy Goal  Goals to be met by: 3/14/17     Patient will increase functional independence with ADLs by performing:    UE Dressing with [place].  LE Dressing with [place].  Grooming while standing at sink with [place].  Toileting from toilet with [place] for hygiene and clothing management.   Stand pivot transfers with [place].  Toilet transfer to toilet with [place].  Outcome: Ongoing (interventions implemented as appropriate)  OT eval completed. The above goals are established to improve functional (I) and mobility.     NOEMI Suarez  3/7/2017  Pager: [phone]

## 2017-03-07 NOTE — PT/OT/SLP EVAL
"Occupational Therapy  Evaluation    Brock Gasca   MRN: [id number]   Admitting Diagnosis: Obstructive jaundice    OT Date of Treatment: 03/07/17   OT Start Time: 1620  OT Stop Time: 1643  OT Total Time (min): 23 min    Billable Minutes:  Evaluation 10 minutes  Therapeutic Activity 13 minutes    Diagnosis: Obstructive jaundice   Decreased endurance, impaired/decreased processing skills    Past Medical History:   Diagnosis Date    Cancer     Diabetes mellitus     Hypertension       Past Surgical History:   Procedure Laterality Date    COLON SURGERY         Referring physician: BEENA Hyman  Date referred to OT: 3/7/2017    General Precautions: Standard, fall, NPO, aspiration    Do you have any cultural, spiritual, Temple conflicts, given your current situation?: none     Patient History:  Living Environment  Lives With: alone  Living Arrangements: apartment  Home Layout: Able to live on 1st floor  Transportation Available: family or friend will provide  Living Environment Comment: Pt lives in apartment on first floor. Pt was completely (I) in ADLs and ambulation PTA, has no DME, has a walk-in shower with built-in seat and regular toilet.  Equipment Currently Used at Home: none    Prior level of function:   Bed Mobility/Transfers: independent  Grooming: independent  Bathing: independent  Upper Body Dressing: independent  Lower Body Dressing: independent  Toileting: independent  Home Management Skills: independent  Homemaking Responsibilities: Yes  Meal Prep Responsibility: Primary  Laundry Responsibility: Primary  Cleaning Responsibility: Primary  Bill Paying/Finance Responsibility: Primary  Shopping Responsibility: Secondary  Mode of Transportation: Family     Dominant hand: right    Subjective:  Communicated with RN prior to session.  "I'm fine, I can do all those things and I have family nearby who can come."  Chief Complaint: LBP  Patient/Family stated goals: get better and go home    Pain Rating:  (Did not rate " but c/o LBP)    Objective:  Patient found with:  (colostomy), pt found supine in bed and agreeable to OT eval.    Cognitive Exam:  Oriented to: Person, Place, Time and Situation  Follows Commands/attention: Follows multistep commands  Communication: clear/fluent  Memory:  No Deficits noted  Safety awareness/insight to disability: intact  Coping skills/emotional control: Appropriate to situation    Visual/perceptual:  Intact    Physical Exam:  Postural examination/scapula alignment: No postural abnormalities identified  Skin integrity: Visible skin intact - Jaundiced  Edema: None noted     Sensation:   Intact    Upper Extremity Range of Motion:  Right Upper Extremity: WFL  Left Upper Extremity: WFL    Upper Extremity Strength:  Right Upper Extremity: WFL  Left Upper Extremity: WFL   Strength: Good    Fine motor coordination:   Intact    Gross motor coordination: WFL    Functional Mobility:  Bed Mobility:  Supine to Sit: Supervision  Sit to Supine: Supervision    Transfers:  Sit <> Stand Assistance: SBA  Sit <> Stand Assistive Device: No Assistive Device    Functional Ambulation: SBA with no AD. Good balance, improved to Supervision. Significant improvement since PT eval in AM.    Activities of Daily Living:    UE Dressing Level of Assistance: Stand by assistance, Set-up Assistance (SBA with verbal cues 2/2 confusion with task of donning gown as robe)  LE Dressing Level of Assistance: Stand by assistance (doff/don socks while sitting EOB)  Grooming Position: Standing at sink  Grooming Level of Assistance: Supervision (wash hands)    Balance:   Static Sit: NORMAL: No deviations seen in posture held statically  Dynamic Sit: GOOD+: Maintains balance through MAXIMAL excursions of active trunk motion  Static Stand: GOOD: Takes MODERATE challenges from all directions  Dynamic stand: GOOD-: Needs SUPERVISION only during gait and able to self right with moderate     Therapeutic Activities and Exercises:  Pt ed re OT role  "and POC. Pt performed supine to sit and scoot to EOB with S. Pt performed strength and ROM testing. Pt donned gown as robe with Setup and SBA with verbal cues for technique and processing of task. Pt doff/donned socks with SBA. Pt performed sit to stand t/f with SBA and no AD, and ambulated to bathroom. Pt refused ADLs in bathroom, but demo good balance as he turned quickly to speak to OT. Pt ambulated back to bed with S and no AD, sat EOB, and returned to supine with S.     AM-PAC 6 CLICK ADL  How much help from another person does this patient currently need?  1 = Unable, Total/Dependent Assistance  2 = A lot, Maximum/Moderate Assistance  3 = A little, Minimum/Contact Guard/Supervision  4 = None, Modified [place]/Independent    Putting on and taking off regular lower body clothing? : 3  Bathing (including washing, rinsing, drying)?: 3  Toileting, which includes using toilet, bedpan, or urinal? : 3  Putting on and taking off regular upper body clothing?: 3  Taking care of personal grooming such as brushing teeth?: 3  Eating meals?: 4  Total Score: 19    AM-PAC Raw Score CMS "G-Code Modifier Level of Impairment Assistance   6 % Total / Unable   7 - 9 CM 80 - 100% Maximal Assist   10 - 14 CL 60 - 80% Moderate Assist   15 - 19 CK 40 - 60% Moderate Assist   20 - 22 CJ 20 - 40% Minimal Assist   23 CI 1-20% SBA / CGA   24 CH 0% Independent/ Mod I       Patient left HOB elevated with call button in reach    Assessment:  Brock Gasca is a 72 y.o. male with a medical diagnosis of Obstructive jaundice and presents with the impairments listed below. Pt is pleasant and motivated to go home. Pt demo minimal processing deficits during UBD tasks of donning gown as robe, but was physically able to perform the task with SBA. Pt's mobility improved significantly since PT eval in the AM, from CGA with RW to SBA with no AD. Pt would benefit from cont OT to maximize (I)ce in self care tasks and improve mobility.    Rehab " identified problem list/impairments: Rehab identified problem list/impairments: weakness, impaired endurance, impaired self care skills, impaired functional mobilty, gait instability, impaired cognition, impaired cardiopulmonary response to activity    Rehab potential is good.    Activity tolerance: Fair    Discharge recommendations: Discharge Facility/Level Of Care Needs: home with home health     Barriers to discharge: Barriers to Discharge: Decreased caregiver support    Equipment recommendations: walker, rolling     GOALS:   Occupational Therapy Goals        Problem: Occupational Therapy Goal    Goal Priority Disciplines Outcome Interventions   Occupational Therapy Goal     OT, PT/OT Ongoing (interventions implemented as appropriate)    Description:  Goals to be met by: 3/14/17     Patient will increase functional independence with ADLs by performing:    UE Dressing with Teller.  LE Dressing with Teller.  Grooming while standing at sink with Teller.  Toileting from toilet with Teller for hygiene and clothing management.   Stand pivot transfers with Teller.  Toilet transfer to toilet with Teller.                PLAN:  Patient to be seen 3 x/week to address the above listed problems via self-care/home management, therapeutic activities, therapeutic exercises  Plan of Care expires: 04/07/17  Plan of Care reviewed with: patient    NOEMI Suarez  3/7/2017  Pager: [phone]

## 2017-03-07 NOTE — PROGRESS NOTES
Advised admitting team that pt had arrived from OSH and needed orders.  Will be up to take history, assess and write orders for pt.

## 2017-03-07 NOTE — H&P
[facility] Hospital Medicine  History & Physical    Patient Name: Brock Gasca  MRN: [id number]  Admission Date: 3/7/2017  Attending Physician: Noy Sanders MD   Primary Care Provider: Primary Doctor [facility] Medicine Team: Networked reference to record PCT  Rosibel Ralph PA-C     Patient information was obtained from patient, past medical records and ER records.     Subjective:     Principal Problem:Obstructive jaundice    Chief Complaint: No chief complaint on file.       HPI: Patient is a 72 year old gentleman with a h/o HTN, DM II, colon cancer, and rectal cancer s/p resection.  Patient was admitted to OSH on 3/1 with persistent anorexia, weight loss (patient reports 30lbs in one month), and jaundice.  He was found to have a Tbili of 8.9, transaminitis, and elevated AlkPhos of 777.  CT and abdominal US showed dilated CBD and intrahepatic ducts and lesions in both lung bases consistent with metastatic disease.  Surgery at OSH attempted an ERCP, but was not able to get very far to determine the cause of obstruction. T bili continued to increase up to 18 and AlkPhos >1000.   He was transferred from [facility] for PBS consult.  On exam, patient complains of no appetite and intermittent midabdominal pain.  He denies N/V, chest pain, SOB, dizziness, palpitations, fever/chills.  Complains of chronic back pain.    Past Medical History:   Diagnosis Date    Cancer     Diabetes mellitus     Hypertension        Past Surgical History:   Procedure Laterality Date    COLON SURGERY         Review of patient's allergies indicates:  No Known Allergies    No current facility-administered medications on file prior to encounter.      No current outpatient prescriptions on file prior to encounter.     Family History     None        Social History Main Topics    Smoking status: Former Smoker    Smokeless tobacco: Not on file    Alcohol use No    Drug use: No    Sexual activity: Not  on file     Review of Systems   Constitutional: Positive for appetite change and unexpected weight change. Negative for activity change, chills, diaphoresis, fatigue and fever.   HENT: Negative for congestion, rhinorrhea, sore throat, trouble swallowing and voice change.    Eyes: Negative for visual disturbance.   Respiratory: Negative for cough, choking, chest tightness, shortness of breath and wheezing.    Cardiovascular: Negative for chest pain, palpitations and leg swelling.   Gastrointestinal: Positive for abdominal pain. Negative for abdominal distention, anal bleeding, blood in stool, constipation, diarrhea, nausea and vomiting.   Endocrine: Negative for cold intolerance, heat intolerance, polydipsia and polyuria.   Genitourinary: Negative for dysuria, flank pain, frequency, hematuria and urgency.   Musculoskeletal: Positive for back pain (Chronic). Negative for arthralgias, joint swelling and myalgias.   Skin: Negative for color change and rash.   Neurological: Negative for dizziness, seizures, syncope, facial asymmetry, speech difficulty, weakness, light-headedness, numbness and headaches.   Hematological: Negative for adenopathy. Does not bruise/bleed easily.   Psychiatric/Behavioral: Negative for agitation, confusion, hallucinations and suicidal ideas.     Objective:     Vital Signs (Most Recent):  Temp: 98.4 °F (36.9 °C) (03/07/17 0332)  Pulse: 78 (03/07/17 0332)  Resp: 18 (03/07/17 0332)  BP: 118/65 (03/07/17 0332)  SpO2: (!) 94 % (03/07/17 0332) Vital Signs (24h Range):  Temp:  [98.4 °F (36.9 °C)] 98.4 °F (36.9 °C)  Pulse:  [78-84] 78  Resp:  [18] 18  SpO2:  [94 %-95 %] 94 %  BP: (118-145)/(65-75) 118/65     Weight: 68.4 kg (150 lb 12.7 oz)  Body mass index is 21.03 kg/(m^2).    Physical Exam   Constitutional: He is oriented to person, place, and time. He appears well-developed. No distress.   HENT:   Head: Normocephalic and atraumatic.   Eyes: Pupils are equal, round, and reactive to light.   Neck:  Neck supple. Carotid bruit is not present. No thyromegaly present.   Cardiovascular: Normal rate and regular rhythm.  Exam reveals no gallop.    No murmur heard.  Pulmonary/Chest: Effort normal and breath sounds normal. No respiratory distress. He has no wheezes.   Abdominal: Bowel sounds are normal. He exhibits no distension. There is no splenomegaly or hepatomegaly. There is no tenderness.   Colostomy   Musculoskeletal: Normal range of motion. He exhibits no edema.   Neurological: He is alert and oriented to person, place, and time.   Skin: Skin is warm and dry. No rash noted.   Psychiatric: He has a normal mood and affect. His behavior is normal.        Significant Labs: All pertinent labs within the past 24 hours have been reviewed.    Significant Imaging: I have reviewed all pertinent imaging results/findings within the past 24 hours.    Assessment/Plan:     * Obstructive jaundice  - Will check labs and consult PBS.  NPO.  - Consider Heme/Onc consult.  - Supportive care.      Essential hypertension  - Continue amlodipine 5mg daily and Bystolic 5mg daily.      Type 2 diabetes mellitus without complication, without long-term current use of insulin  - NPO SSI.      VTE Risk Mitigation         Ordered     Medium Risk of VTE  Once      03/07/17 0317     Place MERARI hose  Until discontinued      03/07/17 0317     Place sequential compression device  Until discontinued      03/07/17 0317        Rosibel Ralph PA-C  Department of Hospital Medicine   [facility]

## 2017-03-07 NOTE — PLAN OF CARE
Problem: Patient Care Overview  Goal: Plan of Care Review  Outcome: Ongoing (interventions implemented as appropriate)  Pt is AAOx4, vital signs have been stable through shift, pt complains of back pain, morphine PRN given as ordered.     Problem: Fall Risk (Adult)  Goal: Identify Related Risk Factors and Signs and Symptoms  Related risk factors and signs and symptoms are identified upon initiation of Human Response Clinical Practice Guideline (CPG)   Outcome: Ongoing (interventions implemented as appropriate)  Pt remains free of falls during shift due to purposeful frequent rounding, bed alarm set, and call light within reach.

## 2017-03-08 ENCOUNTER — ANESTHESIA (OUTPATIENT)
Dept: ENDOSCOPY | Facility: HOSPITAL | Age: 72
DRG: 356 | End: 2017-03-08
Payer: MEDICARE

## 2017-03-08 PROBLEM — D68.9 COAGULOPATHY: Status: ACTIVE | Noted: 2017-03-08

## 2017-03-08 PROBLEM — D63.8 ANEMIA OF CHRONIC DISEASE: Status: ACTIVE | Noted: 2017-03-08

## 2017-03-08 PROBLEM — Z85.048 HISTORY OF RECTAL CANCER: Status: ACTIVE | Noted: 2017-03-08

## 2017-03-08 PROBLEM — R91.8 MULTIPLE LUNG NODULES ON CT: Status: ACTIVE | Noted: 2017-03-08

## 2017-03-08 PROBLEM — E44.0 MALNUTRITION OF MODERATE DEGREE: Status: ACTIVE | Noted: 2017-03-08

## 2017-03-08 LAB
ALBUMIN SERPL BCP-MCNC: 2.3 G/DL
ALP SERPL-CCNC: 1114 U/L
ALT SERPL W/O P-5'-P-CCNC: 199 U/L
ANION GAP SERPL CALC-SCNC: 9 MMOL/L
AST SERPL-CCNC: 194 U/L
BASOPHILS # BLD AUTO: 0.07 K/UL
BASOPHILS # BLD AUTO: 0.07 K/UL
BASOPHILS NFR BLD: 0.3 %
BASOPHILS NFR BLD: 0.3 %
BILIRUB SERPL-MCNC: 25.9 MG/DL
BILIRUB UR QL STRIP: ABNORMAL
BUN SERPL-MCNC: 23 MG/DL
CALCIUM SERPL-MCNC: 9.1 MG/DL
CHLORIDE SERPL-SCNC: 103 MMOL/L
CLARITY UR REFRACT.AUTO: ABNORMAL
CO2 SERPL-SCNC: 23 MMOL/L
COLOR UR AUTO: ABNORMAL
CREAT SERPL-MCNC: 1.2 MG/DL
DIFFERENTIAL METHOD: ABNORMAL
DIFFERENTIAL METHOD: ABNORMAL
EOSINOPHIL # BLD AUTO: 1 K/UL
EOSINOPHIL # BLD AUTO: 1 K/UL
EOSINOPHIL NFR BLD: 4.8 %
EOSINOPHIL NFR BLD: 4.8 %
ERYTHROCYTE [DISTWIDTH] IN BLOOD BY AUTOMATED COUNT: 16.1 %
ERYTHROCYTE [DISTWIDTH] IN BLOOD BY AUTOMATED COUNT: 16.1 %
EST. GFR  (AFRICAN AMERICAN): >60 ML/MIN/1.73 M^2
EST. GFR  (NON AFRICAN AMERICAN): 59.2 ML/MIN/1.73 M^2
GLUCOSE SERPL-MCNC: 120 MG/DL
GLUCOSE UR QL STRIP: NEGATIVE
HCT VFR BLD AUTO: 36.1 %
HCT VFR BLD AUTO: 36.1 %
HGB BLD-MCNC: 12 G/DL
HGB BLD-MCNC: 12 G/DL
HGB UR QL STRIP: NEGATIVE
INR PPP: 1.3
KETONES UR QL STRIP: NEGATIVE
LEUKOCYTE ESTERASE UR QL STRIP: NEGATIVE
LYMPHOCYTES # BLD AUTO: 1.7 K/UL
LYMPHOCYTES # BLD AUTO: 1.7 K/UL
LYMPHOCYTES NFR BLD: 8.7 %
LYMPHOCYTES NFR BLD: 8.7 %
MAGNESIUM SERPL-MCNC: 1.7 MG/DL
MCH RBC QN AUTO: 27.4 PG
MCH RBC QN AUTO: 27.4 PG
MCHC RBC AUTO-ENTMCNC: 33.2 %
MCHC RBC AUTO-ENTMCNC: 33.2 %
MCV RBC AUTO: 82 FL
MCV RBC AUTO: 82 FL
MONOCYTES # BLD AUTO: 1.7 K/UL
MONOCYTES # BLD AUTO: 1.7 K/UL
MONOCYTES NFR BLD: 8.4 %
MONOCYTES NFR BLD: 8.4 %
NEUTROPHILS # BLD AUTO: 15.3 K/UL
NEUTROPHILS # BLD AUTO: 15.3 K/UL
NEUTROPHILS NFR BLD: 76.2 %
NEUTROPHILS NFR BLD: 76.2 %
NITRITE UR QL STRIP: NEGATIVE
PH UR STRIP: 6 [PH] (ref 5–8)
PHOSPHATE SERPL-MCNC: 2.9 MG/DL
PLATELET # BLD AUTO: 348 K/UL
PLATELET # BLD AUTO: 348 K/UL
PMV BLD AUTO: 9.5 FL
PMV BLD AUTO: 9.5 FL
POCT GLUCOSE: 121 MG/DL (ref 70–110)
POCT GLUCOSE: 128 MG/DL (ref 70–110)
POTASSIUM SERPL-SCNC: 3.8 MMOL/L
PROT SERPL-MCNC: 5.8 G/DL
PROT UR QL STRIP: NEGATIVE
PROTHROMBIN TIME: 13.5 SEC
RBC # BLD AUTO: 4.38 M/UL
RBC # BLD AUTO: 4.38 M/UL
SODIUM SERPL-SCNC: 135 MMOL/L
SP GR UR STRIP: 1.01 (ref 1–1.03)
URN SPEC COLLECT METH UR: ABNORMAL
UROBILINOGEN UR STRIP-ACNC: ABNORMAL EU/DL
WBC # BLD AUTO: 20.06 K/UL
WBC # BLD AUTO: 20.06 K/UL

## 2017-03-08 PROCEDURE — 27200946 HC BRUSH, CYTOLOGY: Performed by: INTERNAL MEDICINE

## 2017-03-08 PROCEDURE — 83735 ASSAY OF MAGNESIUM: CPT

## 2017-03-08 PROCEDURE — 25000003 PHARM REV CODE 250: Performed by: NURSE ANESTHETIST, CERTIFIED REGISTERED

## 2017-03-08 PROCEDURE — 63600175 PHARM REV CODE 636 W HCPCS: Performed by: PHYSICIAN ASSISTANT

## 2017-03-08 PROCEDURE — 0FB98ZX EXCISION OF COMMON BILE DUCT, VIA NATURAL OR ARTIFICIAL OPENING ENDOSCOPIC, DIAGNOSTIC: ICD-10-PCS | Performed by: INTERNAL MEDICINE

## 2017-03-08 PROCEDURE — 81003 URINALYSIS AUTO W/O SCOPE: CPT

## 2017-03-08 PROCEDURE — 88112 CYTOPATH CELL ENHANCE TECH: CPT | Performed by: PATHOLOGY

## 2017-03-08 PROCEDURE — 74328 X-RAY BILE DUCT ENDOSCOPY: CPT | Mod: 26,,, | Performed by: INTERNAL MEDICINE

## 2017-03-08 PROCEDURE — 43274 ERCP DUCT STENT PLACEMENT: CPT | Mod: ,,, | Performed by: INTERNAL MEDICINE

## 2017-03-08 PROCEDURE — D9220A PRA ANESTHESIA: Mod: ANES,,, | Performed by: ANESTHESIOLOGY

## 2017-03-08 PROCEDURE — C1769 GUIDE WIRE: HCPCS | Performed by: INTERNAL MEDICINE

## 2017-03-08 PROCEDURE — 43274 ERCP DUCT STENT PLACEMENT: CPT | Performed by: INTERNAL MEDICINE

## 2017-03-08 PROCEDURE — 82962 GLUCOSE BLOOD TEST: CPT | Performed by: INTERNAL MEDICINE

## 2017-03-08 PROCEDURE — 25000003 PHARM REV CODE 250: Performed by: HOSPITALIST

## 2017-03-08 PROCEDURE — 63600175 PHARM REV CODE 636 W HCPCS: Performed by: NURSE ANESTHETIST, CERTIFIED REGISTERED

## 2017-03-08 PROCEDURE — 88173 CYTOPATH EVAL FNA REPORT: CPT | Mod: 26,,, | Performed by: PATHOLOGY

## 2017-03-08 PROCEDURE — 88305 TISSUE EXAM BY PATHOLOGIST: CPT | Mod: 26,,, | Performed by: PATHOLOGY

## 2017-03-08 PROCEDURE — 85025 COMPLETE CBC W/AUTO DIFF WBC: CPT

## 2017-03-08 PROCEDURE — 20600001 HC STEP DOWN PRIVATE ROOM

## 2017-03-08 PROCEDURE — 80053 COMPREHEN METABOLIC PANEL: CPT

## 2017-03-08 PROCEDURE — 37000008 HC ANESTHESIA 1ST 15 MINUTES: Performed by: INTERNAL MEDICINE

## 2017-03-08 PROCEDURE — 43242 EGD US FINE NEEDLE BX/ASPIR: CPT | Performed by: INTERNAL MEDICINE

## 2017-03-08 PROCEDURE — 36415 COLL VENOUS BLD VENIPUNCTURE: CPT

## 2017-03-08 PROCEDURE — 0FBG4ZZ EXCISION OF PANCREAS, PERCUTANEOUS ENDOSCOPIC APPROACH: ICD-10-PCS | Performed by: INTERNAL MEDICINE

## 2017-03-08 PROCEDURE — 99233 SBSQ HOSP IP/OBS HIGH 50: CPT | Mod: ,,, | Performed by: HOSPITALIST

## 2017-03-08 PROCEDURE — 88172 CYTP DX EVAL FNA 1ST EA SITE: CPT | Mod: 26,,, | Performed by: PATHOLOGY

## 2017-03-08 PROCEDURE — 37000009 HC ANESTHESIA EA ADD 15 MINS: Performed by: INTERNAL MEDICINE

## 2017-03-08 PROCEDURE — 27200949 HC CANNULATOME: Performed by: INTERNAL MEDICINE

## 2017-03-08 PROCEDURE — 0F798DZ DILATION OF COMMON BILE DUCT WITH INTRALUMINAL DEVICE, VIA NATURAL OR ARTIFICIAL OPENING ENDOSCOPIC: ICD-10-PCS | Performed by: INTERNAL MEDICINE

## 2017-03-08 PROCEDURE — 25000003 PHARM REV CODE 250: Performed by: PHYSICIAN ASSISTANT

## 2017-03-08 PROCEDURE — 88172 CYTP DX EVAL FNA 1ST EA SITE: CPT | Performed by: PATHOLOGY

## 2017-03-08 PROCEDURE — 27801447 *HC STENT INTRODUTION SYSTEM, FUSION OASIS: Performed by: INTERNAL MEDICINE

## 2017-03-08 PROCEDURE — 43242 EGD US FINE NEEDLE BX/ASPIR: CPT | Mod: 51,,, | Performed by: INTERNAL MEDICINE

## 2017-03-08 PROCEDURE — 88177 CYTP FNA EVAL EA ADDL: CPT | Mod: 26,,, | Performed by: PATHOLOGY

## 2017-03-08 PROCEDURE — C2617 STENT, NON-COR, TEM W/O DEL: HCPCS | Performed by: INTERNAL MEDICINE

## 2017-03-08 PROCEDURE — 88112 CYTOPATH CELL ENHANCE TECH: CPT | Mod: 26,,, | Performed by: PATHOLOGY

## 2017-03-08 PROCEDURE — 27201628 HC NEEDLE, EUSN-1, EUSN-3: Performed by: INTERNAL MEDICINE

## 2017-03-08 PROCEDURE — 88177 CYTP FNA EVAL EA ADDL: CPT | Performed by: PATHOLOGY

## 2017-03-08 PROCEDURE — 84100 ASSAY OF PHOSPHORUS: CPT

## 2017-03-08 PROCEDURE — D9220A PRA ANESTHESIA: Mod: CRNA,,, | Performed by: NURSE ANESTHETIST, CERTIFIED REGISTERED

## 2017-03-08 PROCEDURE — 74328 X-RAY BILE DUCT ENDOSCOPY: CPT | Performed by: INTERNAL MEDICINE

## 2017-03-08 PROCEDURE — 07BD4ZX EXCISION OF AORTIC LYMPHATIC, PERCUTANEOUS ENDOSCOPIC APPROACH, DIAGNOSTIC: ICD-10-PCS | Performed by: INTERNAL MEDICINE

## 2017-03-08 PROCEDURE — 85610 PROTHROMBIN TIME: CPT

## 2017-03-08 RX ORDER — ROCURONIUM BROMIDE 10 MG/ML
INJECTION, SOLUTION INTRAVENOUS
Status: DISCONTINUED | OUTPATIENT
Start: 2017-03-08 | End: 2017-03-08

## 2017-03-08 RX ORDER — ONDANSETRON 2 MG/ML
INJECTION INTRAMUSCULAR; INTRAVENOUS
Status: DISCONTINUED | OUTPATIENT
Start: 2017-03-08 | End: 2017-03-08

## 2017-03-08 RX ORDER — SODIUM CHLORIDE 9 MG/ML
INJECTION, SOLUTION INTRAVENOUS CONTINUOUS
Status: ACTIVE | OUTPATIENT
Start: 2017-03-08 | End: 2017-03-08

## 2017-03-08 RX ORDER — SUCCINYLCHOLINE CHLORIDE 20 MG/ML
INJECTION INTRAMUSCULAR; INTRAVENOUS
Status: DISCONTINUED | OUTPATIENT
Start: 2017-03-08 | End: 2017-03-08

## 2017-03-08 RX ORDER — CIPROFLOXACIN 2 MG/ML
INJECTION, SOLUTION INTRAVENOUS
Status: DISCONTINUED | OUTPATIENT
Start: 2017-03-08 | End: 2017-03-08

## 2017-03-08 RX ORDER — FENTANYL CITRATE 50 UG/ML
INJECTION, SOLUTION INTRAMUSCULAR; INTRAVENOUS
Status: DISCONTINUED | OUTPATIENT
Start: 2017-03-08 | End: 2017-03-08

## 2017-03-08 RX ORDER — LIDOCAINE HCL/PF 100 MG/5ML
SYRINGE (ML) INTRAVENOUS
Status: DISCONTINUED | OUTPATIENT
Start: 2017-03-08 | End: 2017-03-08

## 2017-03-08 RX ORDER — PHENYLEPHRINE HYDROCHLORIDE 10 MG/ML
INJECTION INTRAVENOUS
Status: DISCONTINUED | OUTPATIENT
Start: 2017-03-08 | End: 2017-03-08

## 2017-03-08 RX ORDER — PROPOFOL 10 MG/ML
VIAL (ML) INTRAVENOUS
Status: DISCONTINUED | OUTPATIENT
Start: 2017-03-08 | End: 2017-03-08

## 2017-03-08 RX ADMIN — PHENYLEPHRINE HYDROCHLORIDE 200 MCG: 10 INJECTION INTRAVENOUS at 01:03

## 2017-03-08 RX ADMIN — CIPROFLOXACIN 400 MG: 2 INJECTION, SOLUTION INTRAVENOUS at 02:03

## 2017-03-08 RX ADMIN — ONDANSETRON 4 MG: 2 INJECTION INTRAMUSCULAR; INTRAVENOUS at 01:03

## 2017-03-08 RX ADMIN — PANTOPRAZOLE SODIUM 40 MG: 40 TABLET, DELAYED RELEASE ORAL at 08:03

## 2017-03-08 RX ADMIN — PHENYLEPHRINE HYDROCHLORIDE 200 MCG: 10 INJECTION INTRAVENOUS at 02:03

## 2017-03-08 RX ADMIN — MORPHINE SULFATE 4 MG: 4 INJECTION INTRAVENOUS at 09:03

## 2017-03-08 RX ADMIN — FENTANYL CITRATE 100 MCG: 50 INJECTION, SOLUTION INTRAMUSCULAR; INTRAVENOUS at 01:03

## 2017-03-08 RX ADMIN — PROPOFOL 130 MG: 10 INJECTION, EMULSION INTRAVENOUS at 01:03

## 2017-03-08 RX ADMIN — SUCCINYLCHOLINE CHLORIDE 120 MG: 20 INJECTION, SOLUTION INTRAMUSCULAR; INTRAVENOUS at 01:03

## 2017-03-08 RX ADMIN — LIDOCAINE HYDROCHLORIDE 50 MG: 20 INJECTION, SOLUTION INTRAVENOUS at 01:03

## 2017-03-08 RX ADMIN — ROCURONIUM BROMIDE 10 MG: 10 INJECTION, SOLUTION INTRAVENOUS at 01:03

## 2017-03-08 RX ADMIN — SODIUM CHLORIDE: 0.9 INJECTION, SOLUTION INTRAVENOUS at 11:03

## 2017-03-08 RX ADMIN — SODIUM CHLORIDE: 0.9 INJECTION, SOLUTION INTRAVENOUS at 12:03

## 2017-03-08 RX ADMIN — MORPHINE SULFATE 4 MG: 4 INJECTION INTRAVENOUS at 03:03

## 2017-03-08 RX ADMIN — AMLODIPINE BESYLATE 5 MG: 5 TABLET ORAL at 08:03

## 2017-03-08 RX ADMIN — NEBIVOLOL HYDROCHLORIDE 5 MG: 5 TABLET ORAL at 08:03

## 2017-03-08 NOTE — PLAN OF CARE
Judie Hoffmann MD   [address] [address] / [place] [address]        03/08/17 1221   Discharge Assessment   Assessment Type Discharge Planning Assessment   Confirmed/corrected address and phone number on facesheet? Yes   Assessment information obtained from? Patient   Expected Length of Stay (days) 3   Communicated expected length of stay with patient/caregiver yes   Prior to hospitilization cognitive status: Alert/Oriented   Prior to hospitalization functional status: Independent   Current cognitive status: Alert/Oriented   Current Functional Status: Independent   Arrived From [facility] hospital  (Transfer from [facility])   Able to Return to Prior Arrangements yes   Is patient able to care for self after discharge? Yes   Patient's perception of discharge disposition home or selfcare   Readmission Within The Last 30 Days no previous admission in last 30 days   Patient currently being followed by outpatient case management? No   Patient currently receives home health services? No   Does the patient currently use HME? No   Patient currently receives private duty nursing? No   Patient currently receives any other outside agency services? No   Equipment Currently Used at Home none   Do you have any problems affording any of your prescribed medications? No   Is the patient taking medications as prescribed? yes   Do you have any financial concerns preventing you from receiving the healthcare you need? No   Does the patient have transportation to healthcare appointments? Yes   Transportation Available family or friend will provide   On Dialysis? No   Does the patient receive services at the Coumadin Clinic? No   Discharge Plan A Home Health   Discharge Plan B Home   Patient/Family In Agreement With Plan yes       Unable to give patient the Discharge Information and Education packet at this time-  dept is currently out of packets.

## 2017-03-08 NOTE — H&P
Patient seen and examined. History and exam unchanged from prior history and physical.   Procedure: EUS/ERCP  Indication: Jaundice  ASA Class: III  Airway: normal   Neck Mobility: full range of motion   Mallampatti score: per anesthesia     Anesthesia Plan: General    The impression and plan was discussed in detail with the patient and family. All questions have been answered and the patient voices understanding of our plan at this point. The risk of the procedure was discussed in detail which includes but not limited to bleeding, infection, perforation in some cases requiring surgery with its spectrum of complications.

## 2017-03-08 NOTE — ANESTHESIA POSTPROCEDURE EVALUATION
"Anesthesia Post Evaluation    Patient: Brock Gasca    Procedure(s) Performed: Procedure(s) (LRB):  ULTRASOUND-ENDOSCOPIC-UPPER (N/A)  ERCP (N/A)    Final Anesthesia Type: general  Patient location during evaluation: PACU  Patient participation: Yes- Able to Participate  Level of consciousness: awake and alert  Post-procedure vital signs: reviewed and stable  Pain management: adequate  Airway patency: patent  PONV status at discharge: No PONV  Anesthetic complications: no      Cardiovascular status: hemodynamically stable and blood pressure returned to baseline  Respiratory status: unassisted and spontaneous ventilation  Hydration status: euvolemic  Follow-up not needed.        Visit Vitals    /63    Pulse 76    Temp 36.5 °C (97.7 °F) (Temporal)    Resp 16    Ht 5' 11" (1.803 m)    Wt 67.6 kg (149 lb)    SpO2 96%    BMI 20.78 kg/m2       Pain/Jacobo Score: Pain Assessment Performed: Yes (3/8/2017  2:58 PM)  Presence of Pain: denies (3/8/2017  2:58 PM)  Pain Rating Prior to Med Admin: 8 (3/8/2017  3:04 AM)  Pain Rating Post Med Admin: 0 (3/8/2017  3:34 AM)  Jacobo Score: 9 (3/8/2017  2:58 PM)  Modified Jacobo Score: 19 (3/8/2017  7:18 AM)      "

## 2017-03-08 NOTE — PLAN OF CARE
Problem: Patient Care Overview  Goal: Plan of Care Review  Outcome: Ongoing (interventions implemented as appropriate)    03/07/17 5310   Coping/Psychosocial   Plan Of Care Reviewed With Patient; Safety: call light in reach, patient oriented to room & instructed how to notify nurse if assistance is needed, current questions/concerns addressed, bed in lowest position with wheels locked & side rails up X 3. Pt and family were educated regarding fall precaution and taking appropriate action.  Activity: is up with 1 assist or a walker.  Neurological: Oriented x4 Respiratory: On RA, o2 sat WNL.  Cardiac: BP normative. HR stable. Afebrile this shift. Intake/Output: no problem with urination. pt has LLQ colostomy bag, no bm noted. Diet changed from NPO to 1800c DM and 2gm na. Will be NPO past midnight for ERCP tomorrow. ERCP was canceled today due to high INR. V.K administered as ordered.   Pain: denies any pain. Skin: bruised and jaundice. Other: Plan of care reviewed with the patient and the family. Accucheck ac&hs. Denies any concerns, will continue to monitor.

## 2017-03-08 NOTE — ANESTHESIA RELEASE NOTE
Anesthesia Release from PACU Note    Anesthesia Release from PACU note    Patient: Brock Gasca    Procedure(s) Performed: Procedure(s) (LRB):  ULTRASOUND-ENDOSCOPIC-UPPER (N/A)  ERCP (N/A)    Anesthesia type: general    Post pain: Adequate analgesia    Post assessment: no apparent anesthetic complications, tolerated procedure well and no evidence of recall    Last Vitals:   Vitals:    03/08/17 1520   BP: 118/63   Pulse: 76   Resp: 16   Temp:    SpO2: 96%       Post vital signs: stable    Level of consciousness: awake, alert  and oriented    Nausea/Vomiting: no nausea/no vomiting    Complications: none    Airway Patency: patent    Respiratory: unassisted    Cardiovascular: stable and blood pressure at baseline    Hydration: euvolemic

## 2017-03-08 NOTE — TRANSFER OF CARE
"Anesthesia Transfer of Care Note    Patient: Brock Gasca    Procedure(s) Performed: Procedure(s) (LRB):  ULTRASOUND-ENDOSCOPIC-UPPER (N/A)  ERCP (N/A)    Patient location: PACU    Anesthesia Type: general    Transport from OR: Transported from OR on 6-10 L/min O2 by face mask with adequate spontaneous ventilation    Post pain: adequate analgesia    Post assessment: no apparent anesthetic complications and tolerated procedure well    Post vital signs: stable    Level of consciousness: awake, alert and oriented    Nausea/Vomiting: no nausea/vomiting    Complications: none          Last vitals:   Visit Vitals    /76    Pulse 78    Temp 37.1 °C (98.7 °F) (Oral)    Resp 16    Ht 5' 11" (1.803 m)    Wt 67.6 kg (149 lb)    SpO2 100%    BMI 20.78 kg/m2     "

## 2017-03-08 NOTE — DISCHARGE INSTRUCTIONS
Endoscopic Ultrasound (EUS)    An endoscopic ultrasound (EUS) is a test to look at the inside of your gastrointestinal (GI) tract. It's commonly used to look for cancers or growths in the esophagus, stomach, pancreas, liver, and rectum. It can help to stage cancer (see how advanced a cancer is). EUS may also be used to help diagnose certain diseases or to drain cysts or abscesses.  What is EUS?  EUS shows both ultrasound images and live video of the GI tract. During the test, a flexible tube called an endoscope (scope) is used. At the end of the scope is a tiny video camera and light. The video camera sends live images to a monitor. The scope also contains a very small ultrasound device. This uses sound waves to create images and send them to a monitor.  A needle is passed through the scope. The needle can be used take a small sample of tissue for testing. This is called a biopsy. The needle can be used to take a sample of fluid. This is called fine-needle aspiration (FNA).  Risks and possible complications of EUS  Risks and possible complications include the following:  · Bleeding  · Infection  · A perforation (hole) in the digestive tract   · Risks of sedation or anesthesia   Before the test  Be prepared prior to the test:  · Tell your healthcare provider what medicine you take. This includes vitamins, herbs, and over-the-counter medicine. It also includes any blood thinners, such as warfarin, clopidogrel, ibuprofen, or daily aspirin. Ask your healthcare provider if you need to stop taking some or all of them before the test.  · You may be prescribed antibiotics to take before or after the test. This depends on the area being studied and what is done during the test. These medicines help prevent infection.  · Carefully follow the instructions for preparing for the test to make sure results are accurate. Instructions may include:  ¨ If youre having an EUS of the upper GI tract (esophagus, stomach, duodenum,  pancreas, liver):  § Do not eat or drink for 6 hours before the test.  ¨ If youre having an EUS of the lower GI tract (rectum):  § Before the test, do bowel prep as instructed to clean your rectum of stool. This may involve a clear liquid diet and using a laxative (liquid or pills) the night before the test. Or it may mean doing one or more enemas the morning of the test.  § Do not eat or drink for 6 hours before the test.  · Be sure to arrive on time at the facility. Bring your identification and health insurance card. Leave valuables at home. If you have them, bring X-rays or other test results with you.  Let the healthcare provider know  For your safety, tell the healthcare provider if you:  · Take insulin. Your dose may need to be changed on the day of your test.  · Are allergic to latex.  · Have any other allergies.  · Are taking blood thinners.   During the test  An endoscopic ultrasound usually takes place in a hospital. The procedure itself may take 1 to 2 hours. You will likely go home soon afterward. During the test:  · You lie on your left side on an exam table.  · An intravenous (IV) line will be put into a vein in your arm or hand. This line supplies fluids and medicines. To keep you comfortable during the test, you will be given a sedative medicine. This medicine prevents discomfort and will make you sleepy.  · If you are having an EUS of the upper GI tract, local anesthetic may be sprayed in your throat. This will help you be more comfortable as the healthcare provider inserts the scope. The healthcare provider then gently puts the flexible scope into your mouth or nose and down your throat.  · If youre having an EUS of the lower GI tract, the healthcare provider gently puts the flexible scope into your anus.  · During the test, the scope sends live video and ultrasound images from inside your body to nearby monitors. These are used to examine your GI tract. Specialized procedures, such as drainage,  are done as needed.  · The healthcare provider may discuss the results with you soon after the test. Biopsy results take several  days.  · In most cases, you can go home within a few hours of the test. When you leave the facility, have an adult family member or friend drive you, even if you don't feel that sleepy.  After the test  Here is what to expect after the test:  · You may feel tired from the sedative. This should wear off by the end of the day.  · If you had an upper digestive endoscopy, your throat may feel sore for a day or two. Over-the-counter sore throat lozenges and spray should help.  · You can eat and drink normally as soon as the test is done.  When to call the healthcare provider  Call your healthcare provider if you notice any of the following:  · Fever of 100.4°F (38.0°C) or higher, or as advised by your healthcare provider  · Shortness of breath  · Vomiting blood, blood in stool, or black stools  · Coughing or hoarse voice that wont go away   Date Last Reviewed: 7/1/2016  © 1882-9907 [employer]. [address] [place] [address]. All rights reserved. This information is not intended as a substitute for professional medical care. Always follow your healthcare professional's instructions.        ERCP  (Endoscopic Retrograde Cholangiopancreatography)     The endoscope moves from the mouth, through the upper digestive tract, to the common bile duct opening.          A balloon at the tip of a catheter opens above the stone. The stone is pulled out of the duct and leaves your body through stool.       ERCP stands for endoscopic retrograde cholangiopancreatography. This procedure is used to view the biliary and pancreatic ducts.  It is used to evaluate diseases that affect the ducts and to help locate and treat blockages that may be present.  How do I get ready for ERCP?  · Talk to your doctor about any health problems or allergies you have.  · Ask your doctor about the risks of  ERCP. These include pancreatitis, infection, bleeding, and tearing the bowel.  · Be sure your doctor knows about all medicines you take. You may be told to stop taking some or all of them before the test. This includes:  · All prescription medicines  · Over-the-counter medicines that don't need a prescription  ·  Any street drugs you may use   · Herbs, vitamins, kelp, seaweed, cough syrups, and other supplements  · You may be asked to take antibiotics ahead of time.  · Avoid blood-thinning medicines for 1 week before ERCP.  · Do not eat or drink for 8 to 12 hours before ERCP.  · Have someone ready to take you home.  What happens during the procedure?  · You may be given medicine through an IV to help you relax.  · Your throat is numbed.  · A thin tube (endoscope) is placed into your throat. It is advanced from the throat through the upper digestive tract, to the common bile duct opening. The endoscope lets the doctor see the common bile and pancreatic ducts on a video screen.  · A cut may be made where the common bile duct opens to the duodenum to make it easier to remove stones.  · As blockages are located and removed, X-rays are taken.  · Contrast dye is injected through a catheter to make the duct show up better on the X-rays.  · An imaging technique that uses X-rays to obtain real-time moving images of internal organs is called fluoroscopy. Fluoroscopy is used to watch and guide progress of the procedure.   · In some cases, a plastic tube (stent) is placed to hold the ducts open. This stent may be replaced or removed in 6 to 8 weeks. Or it may be left to fall out on its own and be passed in the stool.  What happens after ERCP?  Your doctor may discuss the test results right away or a return visit may be scheduled. You may go home the same day or spend the night in the hospital. Follow these tips:  · You can return to a normal routine the day after the ERCP.  · If a cut was made in the duct, avoid blood-thinning  medicines such as aspirin for 5 to 7 days.  · Call your doctor right away if you have a fever or abdominal pain. These may be signs of an infection or torn bowel.   Date Last Reviewed: 6/19/2015  © 6648-6278 [employer]. [address] [place] [address], [place] [address]. All rights reserved. This information is not intended as a substitute for professional medical care. Always follow your healthcare professional's instructions.

## 2017-03-08 NOTE — NURSING TRANSFER
Nursing Transfer Note      3/8/2017     Transfer To: 1034    Transfer via stretcher    Transfer with na    Transported by pct    Medicines sent: na    Chart send with patient: Yes    Notified: family not in waiting area    Patient reassessed at: 3/8/17    Upon arrival to floor: call bell in reach    Report called to MARLENA Santana

## 2017-03-08 NOTE — PROGRESS NOTES
Spoke with Dr. Hyman regarding pt urine color very concentrated and tea colored, Pt has been NPO for EGD. Dr. Hyman will place IVF and urine culture.

## 2017-03-08 NOTE — PLAN OF CARE
Problem: Patient Care Overview  Goal: Plan of Care Review  Problem: Patient Care Overview  Goal: Plan of Care Review  Outcome: Ongoing (interventions implemented as appropriate)     03/07/17 3238   Coping/Psychosocial   Plan Of Care Reviewed With Patient; Safety: call light in reach, patient oriented to room & instructed how to notify nurse if assistance is needed, current questions/concerns addressed, bed in lowest position with wheels locked & side rails up X 3. Pt and family were educated regarding fall precaution and taking appropriate action. Activity: is up with 1 assist or a walker. Neurological: Oriented x4 Respiratory: On RA, o2 sat WNL. Cardiac: BP normative. HR stable. Afebrile this shift. Intake/Output: no problem with urination, appears concentrated and tea colored. Due to patient going down for ERCP was unable to collect urine culture. pt has LLQ colostomy bag, no bm noted. Pt was NPO for ERCP.  Will resume diet after ERCP Pain: denies any pain. Skin: bruised and jaundice. Other: Plan of care reviewed with the patient and the family. Accucheck ac&hs. ERCP was performed, stent was placed and biopsy of pancreas performed. Pt is receiving IVF. Family at bedside was updated by the MD regarding the ERCP. Denies any concerns, will continue to monitor.

## 2017-03-08 NOTE — PLAN OF CARE
Problem: Patient Care Overview  Goal: Plan of Care Review  Outcome: Ongoing (interventions implemented as appropriate)  Patient remains AAOx3, vital signs have been stable. Pt has been NPO since midnight. Pt remains free of falls due to frequent purposeful rounding and call light within reach. Plan of care reviewed with patient. Will continue to monitor.

## 2017-03-09 VITALS
OXYGEN SATURATION: 95 % | TEMPERATURE: 98 F | WEIGHT: 148.38 LBS | SYSTOLIC BLOOD PRESSURE: 123 MMHG | HEIGHT: 71 IN | HEART RATE: 78 BPM | BODY MASS INDEX: 20.77 KG/M2 | RESPIRATION RATE: 18 BRPM | DIASTOLIC BLOOD PRESSURE: 60 MMHG

## 2017-03-09 LAB
ALBUMIN SERPL BCP-MCNC: 2.3 G/DL
ALP SERPL-CCNC: 937 U/L
ALT SERPL W/O P-5'-P-CCNC: 159 U/L
ANION GAP SERPL CALC-SCNC: 10 MMOL/L
AST SERPL-CCNC: 111 U/L
BASOPHILS # BLD AUTO: 0.06 K/UL
BASOPHILS # BLD AUTO: 0.06 K/UL
BASOPHILS NFR BLD: 0.4 %
BASOPHILS NFR BLD: 0.4 %
BILIRUB SERPL-MCNC: 15.9 MG/DL
BUN SERPL-MCNC: 27 MG/DL
CALCIUM SERPL-MCNC: 8.6 MG/DL
CHLORIDE SERPL-SCNC: 106 MMOL/L
CO2 SERPL-SCNC: 22 MMOL/L
CREAT SERPL-MCNC: 1.2 MG/DL
DIFFERENTIAL METHOD: ABNORMAL
DIFFERENTIAL METHOD: ABNORMAL
EOSINOPHIL # BLD AUTO: 1 K/UL
EOSINOPHIL # BLD AUTO: 1 K/UL
EOSINOPHIL NFR BLD: 5.7 %
EOSINOPHIL NFR BLD: 5.7 %
ERYTHROCYTE [DISTWIDTH] IN BLOOD BY AUTOMATED COUNT: 16.7 %
ERYTHROCYTE [DISTWIDTH] IN BLOOD BY AUTOMATED COUNT: 16.7 %
EST. GFR  (AFRICAN AMERICAN): >60 ML/MIN/1.73 M^2
EST. GFR  (NON AFRICAN AMERICAN): 59.2 ML/MIN/1.73 M^2
GLUCOSE SERPL-MCNC: 112 MG/DL
HCT VFR BLD AUTO: 32.7 %
HCT VFR BLD AUTO: 32.7 %
HGB BLD-MCNC: 11.2 G/DL
HGB BLD-MCNC: 11.2 G/DL
LYMPHOCYTES # BLD AUTO: 2 K/UL
LYMPHOCYTES # BLD AUTO: 2 K/UL
LYMPHOCYTES NFR BLD: 11.7 %
LYMPHOCYTES NFR BLD: 11.7 %
MAGNESIUM SERPL-MCNC: 1.7 MG/DL
MCH RBC QN AUTO: 28 PG
MCH RBC QN AUTO: 28 PG
MCHC RBC AUTO-ENTMCNC: 34.3 %
MCHC RBC AUTO-ENTMCNC: 34.3 %
MCV RBC AUTO: 82 FL
MCV RBC AUTO: 82 FL
MONOCYTES # BLD AUTO: 1.7 K/UL
MONOCYTES # BLD AUTO: 1.7 K/UL
MONOCYTES NFR BLD: 9.9 %
MONOCYTES NFR BLD: 9.9 %
NEUTROPHILS # BLD AUTO: 12 K/UL
NEUTROPHILS # BLD AUTO: 12 K/UL
NEUTROPHILS NFR BLD: 70.8 %
NEUTROPHILS NFR BLD: 70.8 %
PHOSPHATE SERPL-MCNC: 3 MG/DL
PLATELET # BLD AUTO: 359 K/UL
PLATELET # BLD AUTO: 359 K/UL
PMV BLD AUTO: 9.4 FL
PMV BLD AUTO: 9.4 FL
POCT GLUCOSE: 111 MG/DL (ref 70–110)
POCT GLUCOSE: 112 MG/DL (ref 70–110)
POTASSIUM SERPL-SCNC: 4 MMOL/L
PROT SERPL-MCNC: 5.6 G/DL
RBC # BLD AUTO: 4 M/UL
RBC # BLD AUTO: 4 M/UL
SODIUM SERPL-SCNC: 138 MMOL/L
WBC # BLD AUTO: 16.98 K/UL
WBC # BLD AUTO: 16.98 K/UL

## 2017-03-09 PROCEDURE — 85025 COMPLETE CBC W/AUTO DIFF WBC: CPT

## 2017-03-09 PROCEDURE — 36415 COLL VENOUS BLD VENIPUNCTURE: CPT

## 2017-03-09 PROCEDURE — 99239 HOSP IP/OBS DSCHRG MGMT >30: CPT | Mod: GC,,, | Performed by: HOSPITALIST

## 2017-03-09 PROCEDURE — 25000003 PHARM REV CODE 250: Performed by: PHYSICIAN ASSISTANT

## 2017-03-09 PROCEDURE — 84100 ASSAY OF PHOSPHORUS: CPT

## 2017-03-09 PROCEDURE — 83735 ASSAY OF MAGNESIUM: CPT

## 2017-03-09 PROCEDURE — 94799 UNLISTED PULMONARY SVC/PX: CPT

## 2017-03-09 PROCEDURE — 80053 COMPREHEN METABOLIC PANEL: CPT

## 2017-03-09 PROCEDURE — 25000003 PHARM REV CODE 250: Performed by: HOSPITALIST

## 2017-03-09 RX ADMIN — PANTOPRAZOLE SODIUM 40 MG: 40 TABLET, DELAYED RELEASE ORAL at 09:03

## 2017-03-09 RX ADMIN — THERA TABS 1 TABLET: TAB at 09:03

## 2017-03-09 RX ADMIN — AMLODIPINE BESYLATE 5 MG: 5 TABLET ORAL at 09:03

## 2017-03-09 RX ADMIN — TRAMADOL HYDROCHLORIDE 50 MG: 50 TABLET, FILM COATED ORAL at 12:03

## 2017-03-09 RX ADMIN — RAMELTEON 8 MG: 8 TABLET, FILM COATED ORAL at 12:03

## 2017-03-09 RX ADMIN — NEBIVOLOL HYDROCHLORIDE 5 MG: 5 TABLET ORAL at 09:03

## 2017-03-09 NOTE — ASSESSMENT & PLAN NOTE
- [facility]    Hemoglobin A1C   Date Value Ref Range Status   03/07/2017 6.5 (H) 4.5 - 6.2 % Final     Comment:     According to ADA guidelines, hemoglobin A1C <7.0% represents  optimal control in non-pregnant diabetic patients.  Different  metrics may apply to specific populations.   Standards of Medical Care in Diabetes - 2016.  For the purpose of screening for the presence of diabetes:  <5.7%     Consistent with the absence of diabetes  5.7-6.4%  Consistent with increasing risk for diabetes   (prediabetes)  >or=6.5%  Consistent with diabetes  Currently no consensus exists for use of hemoglobin A1C  for diagnosis of diabetes for children.

## 2017-03-09 NOTE — NURSING
Pt transported to discharge lounge at 1055 via wheelchair with staff; discharge teaching done by Kesha MENDIOLA; Iv removed; pt received all personal belongings

## 2017-03-09 NOTE — PROGRESS NOTES
[facility] Hospital Medicine  Progress Note    Patient Name: Brock Gasca  MRN: [id number]  Patient Class: IP- Inpatient   Admission Date: 3/7/2017  Length of Stay: 1 days  Attending Physician: Luciana Hyman MD  Primary Care Provider: Judie Hoffmann MD    [facility] Medicine Team: [facility] O Luciana Hyman MD    Subjective:     Principal Problem:Obstructive jaundice    HPI:  Patient is a 72 year old gentleman with a h/o HTN, DM II, colon cancer, and rectal cancer s/p resection.  Patient was admitted to OSH on 3/1 with persistent anorexia, weight loss (patient reports 30lbs in one month), and jaundice.  He was found to have a Tbili of 8.9, transaminitis, and elevated AlkPhos of 777.  CT and abdominal US showed dilated CBD and intrahepatic ducts and lesions in both lung bases consistent with metastatic disease.  Surgery at OSH attempted an ERCP, but was not able to get very far to determine the cause of obstruction. T bili continued to increase up to 18 and AlkPhos >1000.   He was transferred from [facility] for PBS consult.  On exam, patient complains of no appetite and intermittent midabdominal pain.  He denies N/V, chest pain, SOB, dizziness, palpitations, fever/chills.  Complains of chronic back pain.    Hospital Course:  3/7 Admit by overnight team, INR too high for ERCP so gave Vit K per [facility] recs for INR <1.6 prior to ERCP    Interval History: Underwent ERCP, results noted, denies pain/N/V, trial of clears and monitor for post-ERCP pancreatitis, possible d/c home tomorrow to f/u with his Surgical Oncologist Dr Timmons and Oncologist Dr Chang in [place]. He complains repeatedly about the cold temperature of his room    Review of Systems   Constitutional: Negative for chills and fever.   HENT: Negative for congestion and sore throat.    Eyes: Negative for photophobia and visual disturbance.   Respiratory: Negative for cough and shortness of breath.    Cardiovascular: Negative for  chest pain and palpitations.   Gastrointestinal: Negative for abdominal pain, blood in stool, constipation, diarrhea, nausea and vomiting.   Endocrine: Negative for cold intolerance and heat intolerance.   Genitourinary: Negative for dysuria and hematuria.   Musculoskeletal: Negative for arthralgias and myalgias.   Skin: Negative for rash and wound.   Allergic/Immunologic: Negative for environmental allergies and food allergies.   Neurological: Negative for seizures and numbness.   Hematological: Negative for adenopathy. Does not bruise/bleed easily.   Psychiatric/Behavioral: Negative for hallucinations and suicidal ideas.     Objective:     Vital Signs (Most Recent):  Temp: 98 °F (36.7 °C) (03/08/17 2000)  Pulse: 73 (03/08/17 2000)  Resp: 20 (03/08/17 2000)  BP: (!) 157/75 (03/08/17 2000)  SpO2: 97 % (03/08/17 2000) Vital Signs (24h Range):  Temp:  [97.7 °F (36.5 °C)-98.7 °F (37.1 °C)] 98 °F (36.7 °C)  Pulse:  [73-87] 73  Resp:  [16-20] 20  SpO2:  [93 %-100 %] 97 %  BP: (105-157)/(53-82) 157/75     Weight: 67.6 kg (149 lb)  Body mass index is 20.78 kg/(m^2).    Intake/Output Summary (Last 24 hours) at 03/08/17 2031  Last data filed at 03/08/17 1800   Gross per 24 hour   Intake          1241.67 ml   Output             1240 ml   Net             1.67 ml      Physical Exam   Constitutional: He is oriented to person, place, and time. He appears well-developed.   Thin   HENT:   Head: Normocephalic and atraumatic.   Mouth/Throat: Oropharynx is clear and moist.   Temporal wasting   Eyes: Conjunctivae are normal. Pupils are equal, round, and reactive to light.   Neck: Normal range of motion. Neck supple. No JVD present. No thyromegaly present.   Cardiovascular: Normal rate, regular rhythm and normal heart sounds.  Exam reveals no gallop and no friction rub.    No murmur heard.  Pulmonary/Chest: Effort normal and breath sounds normal. No respiratory distress. He has no wheezes. He has no rales.   Abdominal: Soft. Bowel  sounds are normal. He exhibits no distension and no mass. There is no tenderness. There is no rebound and no guarding. No hernia.   Musculoskeletal: Normal range of motion. He exhibits no edema or deformity.   Neurological: He is alert and oriented to person, place, and time. No cranial nerve deficit.   Skin: Skin is warm and dry.   Psychiatric: He has a normal mood and affect. His behavior is normal.       Significant Labs: All pertinent labs within the past 24 hours have been reviewed.    Significant Imaging: I have reviewed and interpreted all pertinent imaging results/findings within the past 24 hours.    Assessment/Plan:      * Obstructive jaundice  S/p ERCP with sphincterotomy and stent placement.  1.5 x 1.5cm mass concerning for pancreatic or biliary CA. Bx taken.   Monitor CMP closely tomorrow  Monitor closely for post-ERCP pancreatitis  Currently pain-free      Essential hypertension  - home amlodipine 5mg daily and Bystolic 5mg daily.      Type 2 diabetes mellitus without complication, without long-term current use of insulin  - SSI    Hemoglobin A1C   Date Value Ref Range Status   03/07/2017 6.5 (H) 4.5 - 6.2 % Final     Comment:     According to ADA guidelines, hemoglobin A1C <7.0% represents  optimal control in non-pregnant diabetic patients.  Different  metrics may apply to specific populations.   Standards of Medical Care in Diabetes - 2016.  For the purpose of screening for the presence of diabetes:  <5.7%     Consistent with the absence of diabetes  5.7-6.4%  Consistent with increasing risk for diabetes   (prediabetes)  >or=6.5%  Consistent with diabetes  Currently no consensus exists for use of hemoglobin A1C  for diagnosis of diabetes for children.           Malnutrition of moderate degree  Albumin is mid-2. Start MVI and glucerna.      Anemia of chronic disease  Stable. Monitor      Multiple lung nodules on CT  Probable metastatic disease. F/U Bx taken today. He voices that he prefers to f/u with  his outside oncologist and surgeon.      Coagulopathy  Improved after Vit K 5mg SQ x 1 on 3/7. Not on coumadin.      History of rectal cancer        VTE Risk Mitigation         Ordered     Medium Risk of VTE  Once      03/07/17 0317     Place sequential compression device  Until discontinued      03/07/17 0317          Luciana Hyman MD  Department of Hospital Medicine   [facility]

## 2017-03-09 NOTE — NURSING
Spoke to nurse, pt's discharge is contingent on tolerating solid food. Per nurse, pt ordered a tray, and will let me know after he eats. I placed a call to the discharge lounge, no answer.

## 2017-03-09 NOTE — PLAN OF CARE
Problem: Patient Care Overview  Goal: Plan of Care Review  Outcome: Ongoing (interventions implemented as appropriate)  Pt is AA& O x 4. VSS. C/O of back pain managed with morphine and tramadol. Urine continues to appear dark salvatore in color. HS bg- 121; no coverage required. Pt resting between care; rozerem given at bedtime. Safety maintained with bed in lowest position and call light within reach.

## 2017-03-09 NOTE — ASSESSMENT & PLAN NOTE
Probable metastatic disease. F/U Bx taken today. He voices that he prefers to f/u with his outside oncologist and surgeon.

## 2017-03-09 NOTE — NURSING
Pt just finished breakfast, states only able to eat eggs and needs at least 5 mins to see if he can tolerate it. I explained that i would be taking him to the discharge lounge, where he could wait for his family members, pt very upset, states wants to wait for family in his room. Will continue to assess.

## 2017-03-09 NOTE — SUBJECTIVE & OBJECTIVE
Interval History: Underwent ERCP, results noted, denies pain/N/V, trial of clears and monitor for post-ERCP pancreatitis, possible d/c home tomorrow to f/u with his Surgical Oncologist Dr Timmons and Oncologist Dr Chang in [place]. He complains repeatedly about the cold temperature of his room    Review of Systems   Constitutional: Negative for chills and fever.   HENT: Negative for congestion and sore throat.    Eyes: Negative for photophobia and visual disturbance.   Respiratory: Negative for cough and shortness of breath.    Cardiovascular: Negative for chest pain and palpitations.   Gastrointestinal: Negative for abdominal pain, blood in stool, constipation, diarrhea, nausea and vomiting.   Endocrine: Negative for cold intolerance and heat intolerance.   Genitourinary: Negative for dysuria and hematuria.   Musculoskeletal: Negative for arthralgias and myalgias.   Skin: Negative for rash and wound.   Allergic/Immunologic: Negative for environmental allergies and food allergies.   Neurological: Negative for seizures and numbness.   Hematological: Negative for adenopathy. Does not bruise/bleed easily.   Psychiatric/Behavioral: Negative for hallucinations and suicidal ideas.     Objective:     Vital Signs (Most Recent):  Temp: 98 °F (36.7 °C) (03/08/17 2000)  Pulse: 73 (03/08/17 2000)  Resp: 20 (03/08/17 2000)  BP: (!) 157/75 (03/08/17 2000)  SpO2: 97 % (03/08/17 2000) Vital Signs (24h Range):  Temp:  [97.7 °F (36.5 °C)-98.7 °F (37.1 °C)] 98 °F (36.7 °C)  Pulse:  [73-87] 73  Resp:  [16-20] 20  SpO2:  [93 %-100 %] 97 %  BP: (105-157)/(53-82) 157/75     Weight: 67.6 kg (149 lb)  Body mass index is 20.78 kg/(m^2).    Intake/Output Summary (Last 24 hours) at 03/08/17 2031  Last data filed at 03/08/17 1800   Gross per 24 hour   Intake          1241.67 ml   Output             1240 ml   Net             1.67 ml      Physical Exam   Constitutional: He is oriented to person, place, and time. He appears well-developed.    Thin   HENT:   Head: Normocephalic and atraumatic.   Mouth/Throat: Oropharynx is clear and moist.   Temporal wasting   Eyes: Conjunctivae are normal. Pupils are equal, round, and reactive to light.   Neck: Normal range of motion. Neck supple. No JVD present. No thyromegaly present.   Cardiovascular: Normal rate, regular rhythm and normal heart sounds.  Exam reveals no gallop and no friction rub.    No murmur heard.  Pulmonary/Chest: Effort normal and breath sounds normal. No respiratory distress. He has no wheezes. He has no rales.   Abdominal: Soft. Bowel sounds are normal. He exhibits no distension and no mass. There is no tenderness. There is no rebound and no guarding. No hernia.   Musculoskeletal: Normal range of motion. He exhibits no edema or deformity.   Neurological: He is alert and oriented to person, place, and time. No cranial nerve deficit.   Skin: Skin is warm and dry.   Psychiatric: He has a normal mood and affect. His behavior is normal.       Significant Labs: All pertinent labs within the past 24 hours have been reviewed.    Significant Imaging: I have reviewed and interpreted all pertinent imaging results/findings within the past 24 hours.

## 2017-03-10 ENCOUNTER — TELEPHONE (OUTPATIENT)
Dept: GASTROENTEROLOGY | Facility: CLINIC | Age: 72
End: 2017-03-10

## 2017-03-10 DIAGNOSIS — K83.1 BILIARY STRICTURE: Primary | ICD-10-CM

## 2017-03-11 NOTE — PT/OT/SLP DISCHARGE
Occupational Therapy Discharge Summary    Brock Gasca  MRN: [id number]   Obstructive jaundice   Patient Discharged from acute Occupational Therapy on 3/9/17.  Please refer to prior OT note dated on 3/7/17 for functional status.     Assessment:   Patient was discharge unexpectedly.  Information required to complete and accurate discharge summary is unknown.  Refer to therapy initial evaluation and last progress note for initial and most recent functional status and goal achievement.  Recommendations made may be found in medical record.  GOALS:   Occupational Therapy Goals        Problem: Occupational Therapy Goal    Goal Priority Disciplines Outcome Interventions   Occupational Therapy Goal     OT, PT/OT Ongoing (interventions implemented as appropriate)    Description:  Goals to be met by: 3/14/17     Patient will increase functional independence with ADLs by performing:    UE Dressing with [place].  LE Dressing with [place].  Grooming while standing at sink with [place].  Toileting from toilet with [place] for hygiene and clothing management.   Stand pivot transfers with [place].  Toilet transfer to toilet with [place].              Reasons for Discontinuation of Therapy Services  Medical Issues resolved      Plan:  Patient Discharged to: Unclear per chart review. Therapy was recommending HH therapy.     NOEMI Suarez  3/11/2017  Pager: [phone]

## 2017-03-13 ENCOUNTER — PATIENT OUTREACH (OUTPATIENT)
Dept: ADMINISTRATIVE | Facility: CLINIC | Age: 72
End: 2017-03-13
Payer: MEDICARE

## 2017-03-13 NOTE — PATIENT INSTRUCTIONS
Anemia  Anemia is a condition that occurs when your body does not have enough healthy red blood cells (RBCs). Your RBCs are the parts of your blood that carry oxygen throughout your body. A protein called hemoglobin allows your RBCs to absorb and release oxygen. Without enough RBCs or hemoglobin, your body doesn't get enough oxygen. Symptoms of anemia may then occur.    Symptoms of anemia  Some people with anemia have no symptoms. But most people have symptoms that range from mild to severe. These can include:  · Tiredness (fatigue)  · Weakness  · Pale skin  · Shortness of breath  · Dizziness or fainting  · Rapid heartbeat  · Trouble doing normal amounts of activity  · Jaundice (yellowing of your eyes, skin, or mouth; dark urine)  Causes of anemia  Anemia can occur when your body:  · Loses too much blood  · Does not make enough RBCs  · Destroys your RBCs at a faster rate than it can replace them  · Does not make a normal amount of hemoglobin in your RBCs  These problems can occur for many reasons, including:  · A condition that you are born with (congenital or inherited). This includes sickle cell disease or thalassemia.  · Heavy bleeding for any reason, including injury, surgery, childbirth, or even heavy menstrual periods.  · Being low in certain nutrients, such as iron, folate, or vitamin B12. This may be due to poor diet. Also, a condition like celiac disease or Crohn's disease can cause poor absorption of these nutrients  · Certain chronic conditions like diabetes, arthritis, or kidney disease.  · Certain chronic infections like tuberculosis or HIV.  · Exposure to certain medications, such as those used for chemotherapy.  There are different types of anemia. Your doctor can tell you more about the type of anemia you have and what may have caused it.  Diagnosing anemia  To diagnose anemia, your doctor gives you blood tests. These can include:  · Complete blood cell count (CBC). This test measures the amounts  of the different types of blood cells.  · Blood smear. This test checks the size and shape of your blood cells. To perform the test, your doctor views a drop of your blood under a microscope. Your doctor uses a stain to make the blood cells easier to see.  · Iron studies. These tests measure the amount of iron in your blood. Your body needs iron to make hemoglobin in your RBCs.  · Vitamin B12 and folate studies. These tests check for some of the components that help give RBCs a normal size and shape.  · Reticulocyte count. This test measures the amount of new RBCs that your bone marrow makes.  · Hemoglobin electrophoresis. This test checks for problems with your hemoglobin in RBCs.  Treating anemia  Treatment for anemia is based on the type of anemia, its cause, and the severity of your symptoms. Treatments may include:  · Diet changes. This involves increasing the amount of certain nutrients in your diet, such as iron, vitamin B12, or folate. Your doctor may also prescribe nutrient supplements.  · Medications. Certain medications treat the cause of your anemia. Others help build new RBCs or relieve symptoms. If a medication is the cause of your anemia, you may need to stop or change it.  · Blood transfusions. Replacing some of your blood can increase the number of healthy RBCs in your body.  · Surgery. In some cases, your doctor can do surgery to treat the underlying cause of anemia. If you need surgery, your doctor will explain the procedure and outline the risks and benefits for you.  Long-term concerns  If you have a certain type of anemia, you can expect a full recovery after treatment. If you have other types of anemia (especially a type you're born with), you will need to manage it for life. Your doctor can tell you more.  Date Last Reviewed: 4/27/2015  © 8378-2367 [employer]. [address] [place] [address], [place] [address]. All rights reserved. This information is not intended as a substitute for  professional medical care. Always follow your healthcare professional's instructions.

## 2017-03-14 ENCOUNTER — TELEPHONE (OUTPATIENT)
Dept: GASTROENTEROLOGY | Facility: CLINIC | Age: 72
End: 2017-03-14

## 2017-03-14 NOTE — TELEPHONE ENCOUNTER
----- Message from Anoop Rae MD sent at 3/14/2017  2:53 PM CDT -----  Patient family informed about the results showing adenocarcinoma. Need appointment with Surgical Oncology.

## 2017-03-17 ENCOUNTER — TELEPHONE (OUTPATIENT)
Dept: SURGERY | Facility: CLINIC | Age: 72
End: 2017-03-17

## 2017-03-27 NOTE — DISCHARGE SUMMARY
[facility] Hospital Medicine  Discharge Summary      Patient Name: Brock Gasca  MRN: [id number]  Admission Date: 3/7/2017  Hospital Length of Stay: 2 days  Discharge Date and Time: 3/9/2017 10:57 AM  Attending Physician: Lucaina Hyman MD  Discharging Provider: Luciana Hyman MD  Primary Care Provider: Judie Hoffmann MD    [facility] Medicine Team: [facility]OSP MED O Luciana Hyman MD    HPI:  Patient is a 72 year old gentleman with a h/o HTN, DM II, colon cancer, and rectal cancer s/p resection. Patient was admitted to OSH on 3/1 with persistent anorexia, weight loss (patient reports 30lbs in one month), and jaundice. He was found to have a Tbili of 8.9, transaminitis, and elevated AlkPhos of 777. CT and abdominal US showed dilated CBD and intrahepatic ducts and lesions in both lung bases consistent with metastatic disease. Surgery at OSH attempted an ERCP, but was not able to get very far to determine the cause of obstruction. T bili continued to increase up to 18 and AlkPhos >1000. He was transferred from [facility] for PBS consult. On exam, patient complains of no appetite and intermittent midabdominal pain. He denies N/V, chest pain, SOB, dizziness, palpitations, fever/chills. Complains of chronic back pain.     Hospital Course:  3/7 Admit by overnight team, INR too high for ERCP so gave Vit K per [facility] recs for INR <1.6 prior to ERCP   3/8: Underwent ERCP, results noted, denies pain/N/V, trial of clears and monitor for post-ERCP pancreatitis, possible d/c home tomorrow to f/u with his Surgical Oncologist Dr Timmons and Oncologist Dr Chang in [place]. He complains repeatedly about the cold temperature of his room  3/9: no post-ERCP pancreatitis, d/c home    A/P:  * Obstructive jaundice  S/p ERCP with sphincterotomy and stent placement. 1.5 x 1.5cm mass concerning for pancreatic or biliary CA. Bx taken.   Monitored CMP closely  Monitored closely for post-ERCP pancreatitis. He was  pain-free, tolerating solid diet, able to d/c home the day after ERCP    Multiple lung nodules on CT  Probable metastatic disease. F/U Bx taken 3/8. He voiced that he prefers to f/u with his outside oncologist and surgeon.        Coagulopathy  Improved after Vit K 5mg SQ x 1 on 3/7. Not on coumadin.        Essential hypertension  - home amlodipine 5mg daily and Bystolic 5mg daily.        Type 2 diabetes mellitus without complication, without long-term current use of insulin  - SSI             Hemoglobin A1C   Date Value Ref Range Status   03/07/2017 6.5 (H) 4.5 - 6.2 % Final       Comment:       According to ADA guidelines, hemoglobin A1C <7.0% represents  optimal control in non-pregnant diabetic patients. Different  metrics may apply to specific populations.   Standards of Medical Care in Diabetes - 2016.  For the purpose of screening for the presence of diabetes:  <5.7% Consistent with the absence of diabetes  5.7-6.4% Consistent with increasing risk for diabetes   (prediabetes)  >or=6.5% Consistent with diabetes  Currently no consensus exists for use of hemoglobin A1C  for diagnosis of diabetes for children.            Malnutrition of moderate degree  Albumin is mid-2. Started MVI and glucerna.        Anemia of chronic disease  Stable. Monitored        History of rectal cancer     PE on day of d/c:  Afebrile, VSS - reviewed by MD  Constitutional: He is oriented to person, place, and time. He appears well-developed.   Thin   HENT:   Head: Normocephalic and atraumatic.   Mouth/Throat: Oropharynx is clear and moist.   Temporal wasting   Eyes: Conjunctivae are normal. Pupils are equal, round, and reactive to light.   Neck: Normal range of motion. Neck supple. No JVD present. No thyromegaly present.   Cardiovascular: Normal rate, regular rhythm and normal heart sounds. Exam reveals no gallop and no friction rub.   No murmur heard.  Pulmonary/Chest: Effort normal and breath sounds normal. No respiratory distress. He  has no wheezes. He has no rales.   Abdominal: Soft. Bowel sounds are normal. He exhibits no distension and no mass. There is no tenderness. There is no rebound and no guarding. No hernia.   Musculoskeletal: Normal range of motion. He exhibits no edema or deformity.   Neurological: He is alert and oriented to person, place, and time. No cranial nerve deficit.   Skin: Skin is warm and dry.      VTE Risk Mitigation           Ordered       Medium Risk of VTE Once      03/07/17 0317       Place sequential compression device Until discontinued      03/07/17 0317           Procedure(s) (LRB):  ULTRASOUND-ENDOSCOPIC-UPPER (N/A)  ERCP (N/A)      Indwelling Lines/Drains at time of discharge:   Lines/Drains/Airways     Drain                 Colostomy LLQ -- days                  Consults:   Consults         Status Ordering Provider     Inpatient consult to [facility]  Once     Provider:  (Not yet assigned)    TWYLA Dominguez          Significant Diagnostic Studies: see above    Pending Diagnostic Studies:     None        Final Active Diagnoses:    Diagnosis Date Noted POA    PRINCIPAL PROBLEM:  Obstructive jaundice [K83.8] 03/06/2017 Yes    Malnutrition of moderate degree [E44.0] 03/08/2017 Yes    Anemia of chronic disease [D63.8] 03/08/2017 Yes    Multiple lung nodules on CT [R91.8] 03/08/2017 Yes    Coagulopathy [D68.9] 03/08/2017 Yes    History of rectal cancer [Z85.048] 03/08/2017 Yes    Essential hypertension [I10] 03/07/2017 Yes     Chronic    Type 2 diabetes mellitus without complication, without long-term current use of insulin [E11.9]  Yes     Chronic      Problems Resolved During this Admission:    Diagnosis Date Noted Date Resolved POA      Discharged Condition: stable    Disposition: Home or Self Care    Follow Up:  Follow-up Information     Follow up with Dr Chang (outside Oncologist) In 2 weeks.        Follow up with Dr Timmons (outside surgeon) In 2 weeks.         Patient Instructions:     Diet general   Order Specific Question Answer Comments   Additional restrictions: Diabetic 2000    Additional restrictions: Low Sodium,2gm      Activity as tolerated     Call MD for:  temperature >100.4     Call MD for:  persistent nausea and vomiting or diarrhea     Call MD for:  severe uncontrolled pain     Call MD for:  difficulty breathing or increased cough     Call MD for:  worsening rash     Call MD for:  persistent dizziness, light-headedness, or visual disturbances       Medications:  Reconciled Home Medications:   Discharge Medication List as of 3/9/2017 10:32 AM      START taking these medications    Details   multivitamin (THERAGRAN) tablet Take 1 tablet by mouth once daily., Starting 3/9/2017, Until Discontinued, OTC         CONTINUE these medications which have NOT CHANGED    Details   amlodipine (NORVASC) 5 MG tablet Take 5 mg by mouth once daily., Until Discontinued, Historical Med      cyanocobalamin (VITAMIN B-12) 500 MCG tablet Take 500 mcg by mouth once daily., Until Discontinued, Historical Med      cyproheptadine (PERIACTIN) 4 mg tablet Take 4 mg by mouth 3 (three) times daily as needed., Until Discontinued, Historical Med      ergocalciferol (VITAMIN D2) 50,000 unit Cap Take 50,000 Units by mouth every 7 days., Until Discontinued, Historical Med      lansoprazole (PREVACID) 30 MG capsule Take 30 mg by mouth 2 (two) times daily., Until Discontinued, Historical Med      lisinopril (PRINIVIL,ZESTRIL) 20 MG tablet Take 20 mg by mouth 2 (two) times daily., Until Discontinued, Historical Med      nebivolol (BYSTOLIC) 5 MG Tab Take 5 mg by mouth once daily., Until Discontinued, Historical Med      SAXagliptin (ONGLYZA) 5 mg Tab tablet Take 5 mg by mouth once daily., Until Discontinued, Historical Med      tramadol (ULTRAM) 50 mg tablet Take 50 mg by mouth every 6 (six) hours as needed for Pain (1-2 tabs)., Until Discontinued, Historical Med           Time spent on the  discharge of patient: 40 minutes    Luciana Hyman MD  Department of Hospital Medicine  [facility]

## 2017-04-25 ENCOUNTER — TELEPHONE (OUTPATIENT)
Dept: ENDOSCOPY | Facility: HOSPITAL | Age: 72
End: 2017-04-25

## 2017-04-25 NOTE — TELEPHONE ENCOUNTER
Attempted call about ERCP scheduled 5/11/17 at 1000.  Left message requesting call back.  Also, mailed letter requesting call back.

## 2017-04-26 ENCOUNTER — TELEPHONE (OUTPATIENT)
Dept: ENDOSCOPY | Facility: HOSPITAL | Age: 72
End: 2017-04-26

## 2017-05-01 ENCOUNTER — TELEPHONE (OUTPATIENT)
Dept: ENDOSCOPY | Facility: HOSPITAL | Age: 72
End: 2017-05-01

## 2017-05-02 ENCOUNTER — TELEPHONE (OUTPATIENT)
Dept: ENDOSCOPY | Facility: HOSPITAL | Age: 72
End: 2017-05-02

## 2017-05-03 ENCOUNTER — TELEPHONE (OUTPATIENT)
Dept: ENDOSCOPY | Facility: HOSPITAL | Age: 72
End: 2017-05-03

## 2017-05-04 ENCOUNTER — TELEPHONE (OUTPATIENT)
Dept: ENDOSCOPY | Facility: HOSPITAL | Age: 72
End: 2017-05-04

## 2017-05-04 NOTE — TELEPHONE ENCOUNTER
Adelina España, all attempts have been unsuccessful in making contact with the patient about the ERCP scheduled 5/11/17 at 1000.  On 5 different days, voicemail messages have been left requesting a call back.  Also, a letter was mailed on 4/25/17 requesting a call back.

## 2017-05-16 ENCOUNTER — TELEPHONE (OUTPATIENT)
Dept: GASTROENTEROLOGY | Facility: CLINIC | Age: 72
End: 2017-05-16

## 2017-05-22 ENCOUNTER — TELEPHONE (OUTPATIENT)
Dept: GASTROENTEROLOGY | Facility: CLINIC | Age: 72
End: 2017-05-22

## 2017-05-22 NOTE — TELEPHONE ENCOUNTER
Dr Rae  I have been unable to contact patient to schedule his ERCP. Both numbers listed are not working numbers.

## 2017-05-22 NOTE — TELEPHONE ENCOUNTER
Message   Received: Today   Message Contents   MD Loni Corey MA   Caller: Unspecified (Today,  2:30 PM)             Please send a certified letter.   Anoop Rae MD

## 2017-06-29 NOTE — PT/OT/SLP DISCHARGE
Physical Therapy Discharge Summary    Brock Gasca  MRN: [id number]   Obstructive jaundice   Patient Discharged from acute Physical Therapy on 3/9/2017.  Please refer to prior PT noted date on 3/7/2017 for functional status.     Assessment:   Patient was discharge unexpectedly.  Information required to complete and accurate discharge summary is unknown.  Refer to therapy initial evaluation and last progress note for initial and most recent functional status and goal achievement.  Recommendations made may be found in medical record.  GOALS:    Physical Therapy Goals        Problem: Physical Therapy Goal    Goal Priority Disciplines Outcome Goal Variances Interventions   Physical Therapy Goal     PT/OT, PT Ongoing (interventions implemented as appropriate)     Description:  Goals to be met by: 3/24/2017     Patient will increase functional independence with mobility by performin. Sit to stand transfer with Modified [place]  2. Gait  x 200 feet with Modified [place] using Rolling Walker.   3. Lower extremity exercise program x 30 reps per handout, with independence to increase endurance and activity tolerance.                    Reasons for Discontinuation of Therapy Services  Transfer to alternate level of care.      Plan:  Patient Discharged to: therapy recommending  PT. unable to determine DC placement from chart.

## 2024-08-01 NOTE — ASSESSMENT & PLAN NOTE
S/p ERCP with sphincterotomy and stent placement.  1.5 x 1.5cm mass concerning for pancreatic or biliary CA. Bx taken.   Monitor CMP closely tomorrow  Monitor closely for post-ERCP pancreatitis  Currently pain-free     01-Aug-2024 08:34